# Patient Record
Sex: MALE | Race: WHITE | NOT HISPANIC OR LATINO | Employment: FULL TIME | URBAN - METROPOLITAN AREA
[De-identification: names, ages, dates, MRNs, and addresses within clinical notes are randomized per-mention and may not be internally consistent; named-entity substitution may affect disease eponyms.]

---

## 2023-10-29 PROBLEM — R21 RASH OF UNKNOWN CAUSE: Status: ACTIVE | Noted: 2023-10-29

## 2023-10-29 RX ORDER — EPINEPHRINE 0.3 MG/.3ML
0.3 INJECTION SUBCUTANEOUS
Status: ON HOLD | COMMUNITY
Start: 2020-02-11 | End: 2024-05-01 | Stop reason: ALTCHOICE

## 2023-10-31 ENCOUNTER — OFFICE VISIT (OUTPATIENT)
Dept: OTOLARYNGOLOGY | Facility: CLINIC | Age: 28
End: 2023-10-31
Payer: COMMERCIAL

## 2023-10-31 VITALS — BODY MASS INDEX: 29.27 KG/M2 | HEIGHT: 72 IN | WEIGHT: 216.1 LBS | TEMPERATURE: 96.7 F

## 2023-10-31 DIAGNOSIS — J34.2 NASAL SEPTAL DEVIATION: Primary | ICD-10-CM

## 2023-10-31 DIAGNOSIS — J34.89 NASAL OBSTRUCTION: ICD-10-CM

## 2023-10-31 DIAGNOSIS — J34.89 NASAL AND SINUS DISCHARGE: ICD-10-CM

## 2023-10-31 DIAGNOSIS — J32.8 OTHER CHRONIC SINUSITIS: ICD-10-CM

## 2023-10-31 DIAGNOSIS — J34.89 NASAL SEPTAL PERFORATION: ICD-10-CM

## 2023-10-31 DIAGNOSIS — M95.0 NASAL VALVE COLLAPSE: ICD-10-CM

## 2023-10-31 PROCEDURE — 99204 OFFICE O/P NEW MOD 45 MIN: CPT | Performed by: NURSE PRACTITIONER

## 2023-10-31 PROCEDURE — 31231 NASAL ENDOSCOPY DX: CPT | Performed by: NURSE PRACTITIONER

## 2023-10-31 PROCEDURE — 1036F TOBACCO NON-USER: CPT | Performed by: NURSE PRACTITIONER

## 2023-10-31 RX ORDER — DOXYCYCLINE 100 MG/1
100 TABLET ORAL 2 TIMES DAILY
Qty: 28 TABLET | Refills: 0 | Status: SHIPPED | OUTPATIENT
Start: 2023-10-31 | End: 2023-11-14

## 2023-10-31 RX ORDER — FLUTICASONE PROPIONATE 50 MCG
2 SPRAY, SUSPENSION (ML) NASAL DAILY
Qty: 16 G | Refills: 3 | Status: SHIPPED | OUTPATIENT
Start: 2023-10-31 | End: 2023-11-06 | Stop reason: SDUPTHER

## 2023-10-31 ASSESSMENT — PATIENT HEALTH QUESTIONNAIRE - PHQ9
SUM OF ALL RESPONSES TO PHQ9 QUESTIONS 1 AND 2: 0
1. LITTLE INTEREST OR PLEASURE IN DOING THINGS: NOT AT ALL
2. FEELING DOWN, DEPRESSED OR HOPELESS: NOT AT ALL

## 2023-10-31 NOTE — PROGRESS NOTES
Subjective   Patient ID: Edward Joseph is a 28 y.o. male who presents for New Patient Visit (2nd opinion from septoplasty surgery.).  HPI  Edward Joseph is a 28 y.o. old male   presenting with complaints of sinus and nose problems that began several years ago. The patient describes the sinus/nose problems as gradual onset of left sided nasal obstruction. Patient denies facial pressure, rhinorrhea, facial pain, periorbital edema, throat clearing, hoarseness, loss of taste, and loss of smell. The patient denies epistaxis. The patient has taken saline gel medications to alleviate the problem. The medication saline gel has not helped. The patient has not tried nasal saline irrigations. Does not have a history of allergic rhinitis or allergies. They deny a history of aspirin sensitivity. They report 0 sinus infections per year requiring antibiotic treatment. They deny recent antibiotic usage.     History of prior nasal/sinus surgery or procedure: Septoplasty in 2023 by Dr. Joshua Ennis    Review of Systems  Review of systems is negative for constitutional, ophthalmological, cardiac, pulmonary, renal, gastrointestinal, musculoskeletal, mental health, endocrine, or neurologic disorders (except as listed in the HPI, PM, and Problem List).     Objective   Physical Exam  CONSTITUTIONAL: Vital signs reviewed. Patient appears well developed and well nourished.   GENERAL: this is a healthy appearing male who appears stated age. The patient is alert and appropriately verbally conversant without hoarseness. This patient is in no apparent distress.   FACE: The face was inspected and no cutaneous masses or lesions were visualized. There was no erythema or edema noted. Facial movement was symmetric. No skin lesions were detected. There was no sinus tenderness elicited. TMJ crepitus absent.   EYES: Extra-ocular muscle function was intact. No nystagmus was observed. Pupils were equal.   CRANIAL NERVES: Cranial nerves II, III, IV,  and VI were noted to be intact via extra-ocular muscle movement testing. Cranial nerve VII noted to be intact and symmetric by facial movement. Cranial nerves IX and X noted to be intact by gag reflex and palatal movement. Cranial nerve XII noted to be intact by active and symmetric tongue movement.   NOSE: Examination of the nose revealed the nasal dorsum to be deviated to the left. Intranasal exam reveals the septum is deviated left posteriorly. The inferior turbinates were hypertrophic. Positive Wak[place] maneuver bilaterally. No masses, polyps, mucopus, or other lesion on anterior rhinoscopy. See below procedure note as applicable for further exam.  ORAL CAVITY: Examination of the oral cavity revealed no mass lesions nor infection. The palate was noted to be intact. The tongue exhibited normal mobility. Mucosa was moist without lesion. The lips were free of lesion. Gums were free of inflammation. Dentition: normal without obvious infection or inflammation  OROPHARYNX: The oral pharynx was free of mass lesion or mucosal abnormality. The palate was noted to be without lesion. The uvula was normal appearing. The tonsils were Absent.  EARS: Examination of the ears revealed that the auricles were normally formed with no lesions. The external auditory canals were normal. The tympanic membranes were intact.  There is no inflammation visualized.   NECK: Visualization and palpation of the neck revealed no mass lesions. No skin lesions or inflammatory processes were detected. The cervical musculature was normal to palpation.   CERVICAL LYMPHATICS: There were no palpable lymph nodes in the posterior triangle, submandibular triangle, jugulodigastric region, or central neck.  RESPIRATORY: Normal inspiration and expiration and chest wall expansion, no use of accessory muscles to breathe, no stridor.  NEUROLOGICAL: Patient is ambulatory without assist. Mentation is clear. Answering questions appropriately.     Nasal / sinus  endoscopy    Date/Time: 10/31/2023 11:49 AM    Performed by: LESLI Richardson  Authorized by: LESLI Richardson    Consent:     Consent obtained:  Verbal    Consent given by:  Patient    Risks discussed:  Bleeding, infection and pain    Alternatives discussed:  No treatment, observation and delayed treatment  Procedure details:     Indications: assessment of airway and sino-nasal symptoms      Medication:  Afrin and Francis-Synephrine 2%    Instrument: flexible fiberoptic nasal endoscope      Scope location: bilateral nare    Post-procedure details:     Patient tolerance of procedure:  Tolerated well, no immediate complications  Comments:      Findings: After topical decongestion with decongestant and anesthetic spray, nasal endoscopy was performed using an endoscope. The septum was deviated left posteriorly. Slightly anterior to this deviation there is an approximately 0.5cm perforation. The inferior turbinates were hypertrophic.  The middle turbinates appeared edematous, the middle meatus is with purulence bilaterally. The superior meatus and sphenoethmoid recess are clear bilaterally. The nasal passageway is patent. The nasopharynx was clear. There were no complications and the patient tolerated the procedure well.        Assessment/Plan     Ewdard Joseph is a 28 y.o. year old male with symptoms of nasal obstruction, nasal drainage and clinical findings consistent with chronic rhinosinusitis without nasal polyposis. Patient also has evidence of a residual posterior left nasal septal deviation, nasal valve collapse, nasal septal perforation and a visible deviation of the septum to the left. Rec. Facial plastics referral and doxy and CT Sinus to further assess.       PLAN:  1) Nasal Endoscopy today.Findings: left dev posterior, purulence from middle meatus bilaterally.  Reassurance and counseling provided to patient and his condition was extensively discussed including as noted below:  2) We  will start the patient on antibiotics to complete maximal medical therapy. Rx for doxycycline BID x 14 days. Advised to take with food and discontinue for adverse effects.  3) Patient was instructed to start steroid nasal spray.  4) A CT scan of the sinuses without contrast will be obtained post treatment.  5) At this time, I also recommended a referral to Facial Plastics. He will likely require a combo procedure with Facial Plastics or Facial Plastics alone if CT is not revealing of significant disease.  6) The patient will follow up after the CT scan has been completed.    All questions were answered and patient agrees with established plan of care.

## 2023-10-31 NOTE — PATIENT INSTRUCTIONS
Today you were evaluated by Dionne Encinas CNP.    Please follow-up in as needed. If you have any questions or concerns, please contact my office at [phone].     You will be started on Doxycycline, twice daily, for 2 full weeks.    As discussed, use of doxycycline can cause a skin sensitivity reaction with the sun. Avoid sun exposure while taking this medication. Also, do not take this medication with dairy. Take a daily probiotic.     A CT scan was ordered today. This is a non-contrast CT scan. Please call the  to create an appointment for this scan. However, we do not want you to obtain the imaging until you have completed the full course of the antibiotic medication prescribed. We would also like for you to obtain this CT scan at a [facility] facility.     Please contact our scheduling and  service at [phone]. They will work with you to get your test, imaging, or other appointments scheduled that might have been ordered. Call for CT.    Please contact our scheduling and  service at [phone]. They will work with you to get your test, imaging, or other appointments scheduled that might have been ordered. Call for Dr. Frankel.

## 2023-11-06 ENCOUNTER — OFFICE VISIT (OUTPATIENT)
Dept: OTOLARYNGOLOGY | Facility: CLINIC | Age: 28
End: 2023-11-06
Payer: COMMERCIAL

## 2023-11-06 VITALS
DIASTOLIC BLOOD PRESSURE: 77 MMHG | HEIGHT: 72 IN | BODY MASS INDEX: 29.93 KG/M2 | TEMPERATURE: 98 F | SYSTOLIC BLOOD PRESSURE: 159 MMHG | WEIGHT: 221 LBS

## 2023-11-06 DIAGNOSIS — J34.89 NASAL AND SINUS DISCHARGE: ICD-10-CM

## 2023-11-06 DIAGNOSIS — J34.2 NASAL SEPTAL DEVIATION: ICD-10-CM

## 2023-11-06 DIAGNOSIS — M95.0 NASAL VALVE COLLAPSE: ICD-10-CM

## 2023-11-06 DIAGNOSIS — J34.89 NASAL OBSTRUCTION: ICD-10-CM

## 2023-11-06 DIAGNOSIS — J34.89 NASAL SEPTAL PERFORATION: ICD-10-CM

## 2023-11-06 DIAGNOSIS — J32.8 OTHER CHRONIC SINUSITIS: ICD-10-CM

## 2023-11-06 PROCEDURE — 99222 1ST HOSP IP/OBS MODERATE 55: CPT | Performed by: OTOLARYNGOLOGY

## 2023-11-06 PROCEDURE — 31231 NASAL ENDOSCOPY DX: CPT | Performed by: OTOLARYNGOLOGY

## 2023-11-06 PROCEDURE — 1036F TOBACCO NON-USER: CPT | Performed by: OTOLARYNGOLOGY

## 2023-11-06 ASSESSMENT — PATIENT HEALTH QUESTIONNAIRE - PHQ9
SUM OF ALL RESPONSES TO PHQ9 QUESTIONS 1 AND 2: 0
2. FEELING DOWN, DEPRESSED OR HOPELESS: NOT AT ALL
1. LITTLE INTEREST OR PLEASURE IN DOING THINGS: NOT AT ALL

## 2023-11-06 NOTE — LETTER
November 7, 2023     PHILIPP Richardson-CNP  [address] [facility] [address]    Patient: Edward Joseph   YOB: 1995   Date of Visit: 11/6/2023       Dear Dr. Dionne Encinas, PHILIPP-CNP:    Thank you for referring Edward Joseph to me for evaluation. Below are my notes for this consultation.  If you have questions, please do not hesitate to call me. I look forward to following your patient along with you.       Sincerely,     Jonathan K Frankel, MD      CC: No Recipients  ______________________________________________________________________________________    .History Of Present Illness  Edward Joseph is a 28 y.o. male presenting with a history of chronic nasal congestion. He was referred to clinic today by DIANNE Encinas for consideration for open revision septoplasty. I personally reviewed her most recent note.    The patient reports that he had a septoplasty performed 6 months ago for recurrent epistaxis and moderate nasal congestion. However, since he had the procedure performed, he has not had any issues with epistaxis since then, but had not had any noticeable improvement with his nasal congestion symptoms. In fact, he believes that it may have actually gotten worse. On his exam with Dionne Encinas, he was also noted to have a posterior septal perforation and an acute sinus infection for which he was started on Doxycyline and Flonase nasal sprays. He is due for a post-treatment CT Sinus on 11/15.     He states he has never used Flonase prior to this past week. Denies any known nasal traumas.      Past Medical History  He has no past medical history on file.    Surgical History  He has no past surgical history on file. Prior septoplasty, Tonsillectomy     Social History  He reports that he has never smoked. He has never used smokeless tobacco. No history on file for alcohol use and drug use.    Family History  Family History   Family history unknown: Yes         Allergies  Penicillin    Review of Systems   Constitutional: Negative.    HENT: Negative.     Eyes: Negative.    Respiratory: Negative.     Cardiovascular: Negative.    Gastrointestinal: Negative.    Endocrine: Negative.    Genitourinary: Negative.    Musculoskeletal: Negative.    Skin: Negative.    Allergic/Immunologic: Negative.    Neurological: Negative.    Hematological: Negative.    Psychiatric/Behavioral: Negative.     These systems were reviewed and are negative unless otherwise stated in the HPI      Physical Exam     Constitutional   General appearance: Healthy-appearing, well-nourished, well groomed, in no acute distress.      Voice  Ability to communicate: Normal communication without aids, normal voice quality    Head and Face   Head and face: Atraumatic with no masses, lesions, or scarring.    Salivary glands: No tenderness of the parotid glands or parotid masses. No tenderness of the submandibular glands or submandibular masses.    Facial strength: Normal strength and symmetry, no synkinesis or facial tic.     Eyes   Pupils and irises: EOM intact, PERRLA, conjunctiva non-injected.     Ears  Otoscopic examination: Auditory canals with normal appearance and no obstruction or erythema. Membranes mobile per pneumatic otoscopy, pearly grey, with clear landmarks. No evidence of middle ear effusion.  External inspection of ears: Ears normally formed and free of lesions.     Nose   Dorsum Deviation of the nasal dorsum, no significant deviation of the nasal bones.  No nasal lesions, lacerations or scars.  No polyps  Nasal Mucosa Normal  Nasal tip Abnormal; rotated to the right 2/2 caudal septal deviation  Septum Abnormal; C-Shaped caudal septal deviation to the right  Mid septal deviation to the right  Inferior turbinates: bilateral hypertrophy  Modified Ashwin maneuver Abnormal-moderate improvement  bilaterally      Oral Cavity/Mouth   Lips, teeth, and gums: Normal lips, gums, and dentition.     Throat    Oropharynx: Mucosa moist, no lesions. Hard and soft palate normal. Tongue normal, no lesions or edema. No tonsillar masses or lesions. Normal tongue base.    Neck   Neck: Symmetrical, trachea midline.    Thyroid symmetrical, no enlargement, no tenderness, no nodules.     Pulmonary   Respiratory effort: Chest expands symmetrically. No audible wheeze.      Cardiovascular   Peripheral vascular system: No varicosities, carotid pulse normal, no edema. No jugular venous distension    Lymphatic   Palpation of cervical lymph nodes: No palpable lymph node enlargement, no submandibular adenopathy, no anterior cervical adenopathy, no supraclavicular adenopathy    Neurological/Psychiatric   Cranial nerves: Cranial nerves II - XII intact. Normal gait. No nystagmus  Orientation to person, place, and time: Normal.     Mood and affect: Normal.      Extremities   Appearance of extremities: Normal.       Procedure  .After verbal consent  and topical anesthesia a nasal endoscopy was performed.  No lesions in the nasal cavity, middle meatus or sphenoethmoid recess were seen.  No purulence was noted.  Posterior septal perforation noted--clean edges with no evidence of crusting or bleeding       Last Recorded Vitals  Blood pressure 159/77, temperature 36.7 °C (98 °F), height 1.829 m (6'), weight 100 kg (221 lb).    Relevant Results  Prior to Admission medications    Medication Sig Start Date End Date Taking? Authorizing Provider   doxycycline (Adoxa) 100 mg tablet Take 1 tablet (100 mg) by mouth 2 times a day for 14 days. Take with food twice daily. Take the full course of the medication. Be sure to wear a SPF or keep covered in the sun. 10/31/23 11/14/23 Yes PHILIPP Richardson-CNP   fluticasone (Flonase) 50 mcg/actuation nasal spray Administer 2 sprays into each nostril once daily. Shake gently. Before first use, prime pump. Spray 1 spray in each nostril twice daily. After use, clean tip and replace cap. 10/31/23 11/30/23 Yes  Dionne Encinas, APRN-CNP   EPINEPHrine 0.3 mg/0.3 mL injection syringe Inject 0.3 mL (0.3 mg) into the muscle. As Directed 2/11/20   Historical Provider, MD     No results found.       Assessment/Plan  Problem List Items Addressed This Visit    None  Visit Diagnoses       Nasal septal deviation        Nasal septal perforation        Other chronic sinusitis        Nasal valve collapse        Nasal and sinus discharge        Nasal obstruction               29y/o male with prior history of septoplasty for recurrent epistaxis and nasal congestion who presents to clinic for persistent nasal congestion and consideration for revision septoplasty.     The patient currently is a candidate for a revision procedure at this time, as his caudal deviation was not addressed with his previous surgery. However, with his recent sinus infection, we will wait until he gets his post-treatment CT Sinus prior to making any plans for surgery. In the interim, he will continue to use hs Flonase nasal sprays. We will plan for a virtual visit in about 4-6 weeks.     Given the patient's history of prior surgery and/or prior nasal trauma, I anticipate a possible need for additional sources of cartilage. We discussed the possible need for auricular v autologous costal cartilage v homologous costal cartilage. The patient is comfortable with these options, if necessary.    Given that the patient does not seem to be symptomatic from the perforation and that it is posterior in position, we will defer septal perforation repair.           Jonathan K Frankel, MD

## 2023-11-06 NOTE — PROGRESS NOTES
.History Of Present Illness  Edward Joseph is a 28 y.o. male presenting with a history of chronic nasal congestion. He was referred to clinic today by NP Dionne Encinas for consideration for open revision septoplasty. I personally reviewed her most recent note.    The patient reports that he had a septoplasty performed 6 months ago for recurrent epistaxis and moderate nasal congestion. However, since he had the procedure performed, he has not had any issues with epistaxis since then, but had not had any noticeable improvement with his nasal congestion symptoms. In fact, he believes that it may have actually gotten worse. On his exam with Dionne Encinas, he was also noted to have a posterior septal perforation and an acute sinus infection for which he was started on Doxycyline and Flonase nasal sprays. He is due for a post-treatment CT Sinus on 11/15.     He states he has never used Flonase prior to this past week. Denies any known nasal traumas.      Past Medical History  He has no past medical history on file.    Surgical History  He has no past surgical history on file. Prior septoplasty, Tonsillectomy     Social History  He reports that he has never smoked. He has never used smokeless tobacco. No history on file for alcohol use and drug use.    Family History  Family History   Family history unknown: Yes        Allergies  Penicillin    Review of Systems   Constitutional: Negative.    HENT: Negative.     Eyes: Negative.    Respiratory: Negative.     Cardiovascular: Negative.    Gastrointestinal: Negative.    Endocrine: Negative.    Genitourinary: Negative.    Musculoskeletal: Negative.    Skin: Negative.    Allergic/Immunologic: Negative.    Neurological: Negative.    Hematological: Negative.    Psychiatric/Behavioral: Negative.     These systems were reviewed and are negative unless otherwise stated in the HPI      Physical Exam     Constitutional   General appearance: Healthy-appearing, well-nourished,  well groomed, in no acute distress.      Voice  Ability to communicate: Normal communication without aids, normal voice quality    Head and Face   Head and face: Atraumatic with no masses, lesions, or scarring.    Salivary glands: No tenderness of the parotid glands or parotid masses. No tenderness of the submandibular glands or submandibular masses.    Facial strength: Normal strength and symmetry, no synkinesis or facial tic.     Eyes   Pupils and irises: EOM intact, PERRLA, conjunctiva non-injected.     Ears  Otoscopic examination: Auditory canals with normal appearance and no obstruction or erythema. Membranes mobile per pneumatic otoscopy, pearly grey, with clear landmarks. No evidence of middle ear effusion.  External inspection of ears: Ears normally formed and free of lesions.     Nose   Dorsum Deviation of the nasal dorsum, no significant deviation of the nasal bones.  No nasal lesions, lacerations or scars.  No polyps  Nasal Mucosa Normal  Nasal tip Abnormal; rotated to the right 2/2 caudal septal deviation  Septum Abnormal; C-Shaped caudal septal deviation to the right  Mid septal deviation to the right  Inferior turbinates: bilateral hypertrophy  Modified Ashwin maneuver Abnormal-moderate improvement  bilaterally      Oral Cavity/Mouth   Lips, teeth, and gums: Normal lips, gums, and dentition.     Throat   Oropharynx: Mucosa moist, no lesions. Hard and soft palate normal. Tongue normal, no lesions or edema. No tonsillar masses or lesions. Normal tongue base.    Neck   Neck: Symmetrical, trachea midline.    Thyroid symmetrical, no enlargement, no tenderness, no nodules.     Pulmonary   Respiratory effort: Chest expands symmetrically. No audible wheeze.      Cardiovascular   Peripheral vascular system: No varicosities, carotid pulse normal, no edema. No jugular venous distension    Lymphatic   Palpation of cervical lymph nodes: No palpable lymph node enlargement, no submandibular adenopathy, no anterior  cervical adenopathy, no supraclavicular adenopathy    Neurological/Psychiatric   Cranial nerves: Cranial nerves II - XII intact. Normal gait. No nystagmus  Orientation to person, place, and time: Normal.     Mood and affect: Normal.      Extremities   Appearance of extremities: Normal.       Procedure  .After verbal consent  and topical anesthesia a nasal endoscopy was performed.  No lesions in the nasal cavity, middle meatus or sphenoethmoid recess were seen.  No purulence was noted.  Posterior septal perforation noted--clean edges with no evidence of crusting or bleeding       Last Recorded Vitals  Blood pressure 159/77, temperature 36.7 °C (98 °F), height 1.829 m (6'), weight 100 kg (221 lb).    Relevant Results  Prior to Admission medications    Medication Sig Start Date End Date Taking? Authorizing Provider   doxycycline (Adoxa) 100 mg tablet Take 1 tablet (100 mg) by mouth 2 times a day for 14 days. Take with food twice daily. Take the full course of the medication. Be sure to wear a SPF or keep covered in the sun. 10/31/23 11/14/23 Yes LESLI Richardson   fluticasone (Flonase) 50 mcg/actuation nasal spray Administer 2 sprays into each nostril once daily. Shake gently. Before first use, prime pump. Spray 1 spray in each nostril twice daily. After use, clean tip and replace cap. 10/31/23 11/30/23 Yes LESLI Richardson   EPINEPHrine 0.3 mg/0.3 mL injection syringe Inject 0.3 mL (0.3 mg) into the muscle. As Directed 2/11/20   Historical Provider, MD     No results found.       Assessment/Plan   Problem List Items Addressed This Visit    None  Visit Diagnoses       Nasal septal deviation        Nasal septal perforation        Other chronic sinusitis        Nasal valve collapse        Nasal and sinus discharge        Nasal obstruction               29y/o male with prior history of septoplasty for recurrent epistaxis and nasal congestion who presents to clinic for persistent nasal congestion  and consideration for revision septoplasty.     The patient currently is a candidate for a revision procedure at this time, as his caudal deviation was not addressed with his previous surgery. However, with his recent sinus infection, we will wait until he gets his post-treatment CT Sinus prior to making any plans for surgery. In the interim, he will continue to use hs Flonase nasal sprays. We will plan for a virtual visit in about 4-6 weeks.     Given the patient's history of prior surgery and/or prior nasal trauma, I anticipate a possible need for additional sources of cartilage. We discussed the possible need for auricular v autologous costal cartilage v homologous costal cartilage. The patient is comfortable with these options, if necessary.    Given that the patient does not seem to be symptomatic from the perforation and that it is posterior in position, we will defer septal perforation repair.           Jonathan K Frankel, MD

## 2023-11-07 PROBLEM — J34.829 NASAL VALVE COLLAPSE: Status: ACTIVE | Noted: 2023-11-07

## 2023-11-07 PROBLEM — M95.0 NASAL VALVE COLLAPSE: Status: ACTIVE | Noted: 2023-11-07

## 2023-11-07 PROBLEM — J34.2 NASAL SEPTAL DEVIATION: Status: ACTIVE | Noted: 2023-11-07

## 2023-11-07 PROBLEM — J32.8 OTHER CHRONIC SINUSITIS: Status: ACTIVE | Noted: 2023-11-07

## 2023-11-07 PROBLEM — J34.89 NASAL SEPTAL PERFORATION: Status: ACTIVE | Noted: 2023-11-07

## 2023-11-07 PROBLEM — J34.89 NASAL OBSTRUCTION: Status: ACTIVE | Noted: 2023-11-07

## 2023-11-07 RX ORDER — FLUTICASONE PROPIONATE 50 MCG
2 SPRAY, SUSPENSION (ML) NASAL DAILY
Qty: 16 G | Refills: 3 | Status: SHIPPED | OUTPATIENT
Start: 2023-11-07 | End: 2023-12-07

## 2023-12-14 ENCOUNTER — APPOINTMENT (OUTPATIENT)
Dept: OTOLARYNGOLOGY | Facility: CLINIC | Age: 28
End: 2023-12-14
Payer: COMMERCIAL

## 2024-01-04 ENCOUNTER — ANCILLARY PROCEDURE (OUTPATIENT)
Dept: RADIOLOGY | Facility: CLINIC | Age: 29
End: 2024-01-04
Payer: COMMERCIAL

## 2024-01-04 DIAGNOSIS — J32.8 OTHER CHRONIC SINUSITIS: ICD-10-CM

## 2024-01-04 PROCEDURE — 70486 CT MAXILLOFACIAL W/O DYE: CPT

## 2024-01-04 PROCEDURE — 70486 CT MAXILLOFACIAL W/O DYE: CPT | Performed by: RADIOLOGY

## 2024-01-09 ENCOUNTER — TELEMEDICINE (OUTPATIENT)
Dept: OTOLARYNGOLOGY | Facility: CLINIC | Age: 29
End: 2024-01-09
Payer: COMMERCIAL

## 2024-01-09 DIAGNOSIS — J34.89 NASAL AND SINUS DISCHARGE: ICD-10-CM

## 2024-01-09 DIAGNOSIS — J34.89 NASAL SEPTAL PERFORATION: ICD-10-CM

## 2024-01-09 DIAGNOSIS — J32.2 CHRONIC ETHMOIDAL SINUSITIS: ICD-10-CM

## 2024-01-09 DIAGNOSIS — J34.2 NASAL SEPTAL DEVIATION: Primary | ICD-10-CM

## 2024-01-09 DIAGNOSIS — J32.0 CHRONIC MAXILLARY SINUSITIS: ICD-10-CM

## 2024-01-09 PROCEDURE — 99213 OFFICE O/P EST LOW 20 MIN: CPT | Performed by: NURSE PRACTITIONER

## 2024-01-09 NOTE — PROGRESS NOTES
Subjective   Patient ID: Edward Joseph is a 28 y.o. male who presents for No chief complaint on file..  HPI  Edward Joseph is a 28 y.o. year old male with symptoms of nasal obstruction, nasal drainage and clinical findings consistent with chronic rhinosinusitis without nasal polyposis. Patient also has evidence of a residual posterior left nasal septal deviation, nasal valve collapse, nasal septal perforation and a visible deviation of the septum to the left. Rec. Facial plastics referral and doxy and CT Sinus to further assess.   1/9/24- TELEHEALTH- Patient presents for follow-up. He notes that he continues with nasal obstruction, nasal drainage (anterior, can alternate between thick clear), he also has pressure throughout his face. He reports that he discussed having a rhinoplasty with Dr. Jonathan Frankel.     I personally reviewed the patients CT scan images and results. I discussed the results personally with the patient. The following findings were discussed: 1/4/24: CT Sinus: Septum midline with perforation. Moderate mucosal thickening to bilateral maxillary and ethmoid sinuses. Minimal in the frontal and sphenoid sinuses.     Per prior note:   Edward Joseph is a 28 y.o. old male   presenting with complaints of sinus and nose problems that began several years ago. The patient describes the sinus/nose problems as gradual onset of left sided nasal obstruction. Patient denies facial pressure, rhinorrhea, facial pain, periorbital edema, throat clearing, hoarseness, loss of taste, and loss of smell. The patient denies epistaxis. The patient has taken saline gel medications to alleviate the problem. The medication saline gel has not helped. The patient has not tried nasal saline irrigations. Does not have a history of allergic rhinitis or allergies. They deny a history of aspirin sensitivity. They report 0 sinus infections per year requiring antibiotic treatment. They deny recent antibiotic usage.      History of prior nasal/sinus surgery or procedure: Septoplasty in 2023 by Dr. Joshua Ennis    Review of Systems  Review of systems is negative for constitutional, ophthalmological, cardiac, pulmonary, renal, gastrointestinal, musculoskeletal, mental health, endocrine, or neurologic disorders (except as listed in the HPI, PMH, and Problem List).     Objective   Physical Exam  CONSTITUTIONAL: Patient appears well developed and well nourished.   GENERAL: this is a healthy appearing male who appears stated age. The patient is alert and appropriately verbally conversant without hoarseness. This patient is in no apparent distress.   FACE: The face was inspected and no cutaneous masses or lesions were visualized. There was no erythema or edema noted. Facial movement was symmetric. No skin lesions were detected.    EYES: Extra-ocular muscle function was intact. No nystagmus was observed. Pupils were equal.   NOSE: Examination of the nose revealed the nasal dorsum to be deviated to the left. RESPIRATORY: Normal inspiration and expiration and chest wall expansion, no use of accessory muscles to breathe, no stridor.  NEUROLOGICAL: Patient is ambulatory without assist. Mentation is clear. Answering questions appropriately.       Assessment/Plan     Edward Joseph is a 28 y.o. year old male with symptoms of nasal obstruction, nasal drainage and clinical findings consistent with chronic rhinosinusitis without nasal polyposis. Patient also has evidence of a residual posterior left nasal septal deviation, nasal valve collapse, nasal septal perforation and a visible deviation of the septum to the left. Rec. Facial plastics referral and doxy and CT Sinus to further assess.   1/9/24- TELEHEALTH- Continues with obstruction, drainage. Will attempt to coordinate combo with Dr. Frankel and refer to Dr. Remy Torres.      PLAN:  1) Telehealth visit today.  Reassurance and counseling provided to patient and his condition was  extensively discussed including as noted below:  2) I personally reviewed the patients CT scan images and results. I discussed the results personally with the patient. The following findings were discussed: 1/4/24: CT Sinus: Septum midline with perforation. Moderate mucosal thickening to bilateral maxillary and ethmoid sinuses. Minimal in the frontal and sphenoid sinuses.   3) Patient was instructed to start steroid nasal spray.  4) The patient and myself had an extensive discussion regarding next steps for further management. We discussed endoscopic sinus surgery at length. We discussed implications for treatment. Patient referred for surgical management with Dr. Remy Torres. His office was contacted directly for scheduling.     All questions were answered and patient agrees with established plan of care.    11 minutes was spent on this patient's visit. More than 50% of that time was spent in counseling regarding the possible etiologies, test results, treatment options and coordinating care.

## 2024-01-15 ENCOUNTER — OFFICE VISIT (OUTPATIENT)
Dept: OTOLARYNGOLOGY | Facility: CLINIC | Age: 29
End: 2024-01-15
Payer: COMMERCIAL

## 2024-01-15 VITALS — WEIGHT: 223 LBS | TEMPERATURE: 97.8 F | BODY MASS INDEX: 30.2 KG/M2 | HEIGHT: 72 IN

## 2024-01-15 DIAGNOSIS — J32.2 CHRONIC ETHMOIDAL SINUSITIS: ICD-10-CM

## 2024-01-15 DIAGNOSIS — R09.81 NASAL CONGESTION: ICD-10-CM

## 2024-01-15 DIAGNOSIS — J34.2 DEVIATED SEPTUM: ICD-10-CM

## 2024-01-15 DIAGNOSIS — J32.0 CHRONIC MAXILLARY SINUSITIS: Primary | ICD-10-CM

## 2024-01-15 PROCEDURE — 1036F TOBACCO NON-USER: CPT | Performed by: OTOLARYNGOLOGY

## 2024-01-15 PROCEDURE — 99214 OFFICE O/P EST MOD 30 MIN: CPT | Performed by: OTOLARYNGOLOGY

## 2024-01-15 PROCEDURE — 31231 NASAL ENDOSCOPY DX: CPT | Performed by: OTOLARYNGOLOGY

## 2024-01-15 ASSESSMENT — PATIENT HEALTH QUESTIONNAIRE - PHQ9
2. FEELING DOWN, DEPRESSED OR HOPELESS: NOT AT ALL
1. LITTLE INTEREST OR PLEASURE IN DOING THINGS: NOT AT ALL
SUM OF ALL RESPONSES TO PHQ9 QUESTIONS 1 AND 2: 0

## 2024-01-15 NOTE — PROGRESS NOTES
Chief Complaint:  Chronic sinusitis; nasal congestion    History Of Present Illness:  Irwin presents as a new patient to me but he has been evaluated by my nurse practitioner partner both October 31, 2023 and virtually January 9, 2024.  He has been evaluated by Dr. Frankel November 6, 2023    Years of syptoms worse over last year     Main Symptoms:  Patient does not have anterior nasal drainage.     Patient has  posterior nasal drainage.  off and on  Patient has nasal airway obstruction. bilaterally  Patient has  facial pain.  cheeks  Patient has  facial pressure.  cheeks  Patient does not have decreased sense of smell.   Associated Symptoms:   Patient has  headaches.    Patient does not have throat clearing.    Patient does not have coughing.    Patient does not have dysphonia.   Patient has nasal bleeding.  Bilateral    Medications currently on for sinonasal symptoms:   None.  Medications tried in the past for sinonasal symptoms:   Flonase around one month, Doxycycline    Other Pertinent Medical Conditions:   Patient does not have asthma.    Patient does not have aspirin sensitivity.    Patient does not have migraines.    Patient does not have history of allergy testing.   Patient has history of sinus surgery.  Dr. Ennis septoplasty  Patient does not have history of nasal fracture.    Patient does not have heartburn.    The patient does not take medical therapy for heartburn.   The patient has had imaging of sinuses. When: 1/4/23    Edward Joseph presents with ongoing symptoms as outlined above.  He has been evaluated by my nurse practitioner partner as well as Dr. Frankel for his ongoing symptoms.  Of note he has undergone a previous septoplasty with a different provider but unfortunately continues with nasal airway obstruction.  He has used Flonase in the past without resolution of his symptoms.     Active Problems:  Patient Active Problem List   Diagnosis    Rash of unknown cause    Nasal septal perforation     Nasal septal deviation    Nasal valve collapse    Nasal obstruction    Other chronic sinusitis        Past Medical History:  He has no past medical history on file.    Surgical History:  He has no past surgical history on file.     Family History:  Family History   Family history unknown: Yes       Social History:  He reports that he has never smoked. He has never used smokeless tobacco. No history on file for alcohol use and drug use.     Allergies:  Penicillin    Current Meds:    Current Outpatient Medications:     EPINEPHrine 0.3 mg/0.3 mL injection syringe, Inject 0.3 mL (0.3 mg) into the muscle. As Directed, Disp: , Rfl:     fluticasone (Flonase) 50 mcg/actuation nasal spray, Administer 2 sprays into each nostril once daily. Shake gently. Before first use, prime pump. Spray 1 spray in each nostril twice daily. After use, clean tip and replace cap., Disp: 16 g, Rfl: 3    Vitals:  Visit Vitals  Temp 36.6 °C (97.8 °F)   Ht 1.829 m (6')   Wt 101 kg (223 lb)   BMI 30.24 kg/m²   Smoking Status Never   BSA 2.27 m²        Physical Exam:  Nose: On external exam there are neither lesions nor asymmetry of the nasal tip/dorsum. On anterior rhinoscopy, visualization posteriorly is limited on anterior examination. For this reason, to adequately evaluate posteriorly for masses, source of epistaxis, polypoid disease, debridement, and/or signs of infections, nasal endoscopy is indicated.  (Please see procedure below.)    SINONASAL ENDOSCOPY (CPT 08903): To better evaluate the patient's symptoms, sinonasal endoscopy is indicated.  After discussion of risks and benefits, and topical decongestion and anesthesia,an endoscope was used to perform nasal endoscopy on each side.  A time out identifying the patient, the procedure, the location of the procedure and any concerns was performed prior to beginning the procedure.    Findings:  Examination of the right nasal cavity revealed a deviated septum to that side with a caudal and  dorsal component.  There were clear thick secretions draining from each middle meatus.  The sphenoethmoid recess was normal bilaterally.  He has a mid septal perforation.    Results/Data:  I personally reviewed his CT sinus from January 4, 2024 with the patient today in clinic.  There were inflammatory changes within each maxillary, ethmoid, and frontal.  Each sphenoid was clear.    Provider Impressions:  1.  Chronic sinusitis  2.  Nasal airway obstruction; residual septal deviation to the right following septoplasty, septal perforation  3.  Postnasal drainage  4.  Facial pain and pressure, headaches  5.  Epistaxis    Discussion: Edward Joseph and I discussed options.  He is planning to undergo revision nasal airway surgery with Dr. Frankel and I think this is more than reasonable given his exam today.  We discussed the inherent complexity of revision nasal airway surgery particularly in the context of deviations within the first 15 mm of his nasal septum.  He and I discussed his ongoing symptoms outside of congestion specifically postnasal drainage and facial pain and pressure.  Based on his CT scan, I do think chronic sinusitis is driving a portion of his symptoms.  He was comfortable considering surgical options for his sinuses.    The sinus surgery itself was discussed at length.  The risks, benefits, and alternatives were explained in detail which include but is not limited to: persistence of symptoms, pain, bleeding, smell loss or alteration, infection, need for nasal packing, double vision, vision loss, CSF leak requiring other procedures to repair, numbness of teeth/and or face, need for revision surgery, and unexpected risks.      He has follow-up scheduled with Dr. Frankel in the next few weeks.  We can plan for a joint procedure with Dr. Frankel addressing his nasal airway and I will be addressing his sinuses.    I will schedule the patient at their convenience going forward.  All questions were  answered.    Patient Discussion/Summary:  Please feel free to contact my office by calling [phone] with any questions.    Signature:  Remy Torres MD

## 2024-01-18 ENCOUNTER — TELEMEDICINE (OUTPATIENT)
Dept: OTOLARYNGOLOGY | Facility: CLINIC | Age: 29
End: 2024-01-18
Payer: COMMERCIAL

## 2024-01-18 DIAGNOSIS — J34.89 NASAL OBSTRUCTION: ICD-10-CM

## 2024-01-18 PROBLEM — J34.2 DEVIATED NASAL SEPTUM: Status: ACTIVE | Noted: 2024-01-18

## 2024-01-18 PROBLEM — J32.0 CHRONIC MAXILLARY SINUSITIS: Status: ACTIVE | Noted: 2024-01-18

## 2024-01-18 PROBLEM — J34.3 HYPERTROPHY OF INFERIOR NASAL TURBINATE: Status: ACTIVE | Noted: 2024-01-18

## 2024-01-18 PROBLEM — J32.2 CHRONIC ETHMOIDAL SINUSITIS: Status: ACTIVE | Noted: 2024-01-18

## 2024-01-18 PROCEDURE — 99212 OFFICE O/P EST SF 10 MIN: CPT | Performed by: OTOLARYNGOLOGY

## 2024-01-18 NOTE — PROGRESS NOTES
Patient presenting virtually for follow-up of nasal airway obstruction.    Patient was seen Dr. Torres for evaluation of bilateral fess.    No improvement with medical management    Patient is a candidate for nasal airway surgery and bilateral fess.  We will schedule as a combo case.    I had a thorough conversation with the patient during which I discussed the risks, benefits, and alternatives of surgery.  The risks that were discussed included, but were not exclusive to, persistence of symptoms, anesthesia, pain, changes in loss of smell, nasal obstruction, septal perforation, bleeding, infection, need for nasal packing, CSF leak requiring other procedures to repair, numbness of teeth and/or face, need for revision surgery, injury to surrounding tissue and unexpected risks.  No guarantees were made. The patient's questions were appropriately addressed.  The patient expressed understanding.    Patient is not interested in aesthetic changes, but understands that some changes may occur as a result of functional improvement.    Will proceed with scheduling    Jonathan Frankel, MD  Facial Plastic Surgery  Otolaryngology - [facility]    This note was dictated with Dragon Naturally Speaking and may contain some grammatical errors due to limitations of the software.

## 2024-04-29 ENCOUNTER — ANESTHESIA EVENT (OUTPATIENT)
Dept: OPERATING ROOM | Facility: CLINIC | Age: 29
End: 2024-04-29
Payer: COMMERCIAL

## 2024-05-01 ENCOUNTER — ANESTHESIA (OUTPATIENT)
Dept: OPERATING ROOM | Facility: CLINIC | Age: 29
End: 2024-05-01
Payer: COMMERCIAL

## 2024-05-01 ENCOUNTER — HOSPITAL ENCOUNTER (OUTPATIENT)
Facility: CLINIC | Age: 29
Setting detail: OUTPATIENT SURGERY
Discharge: HOME | End: 2024-05-01
Attending: OTOLARYNGOLOGY | Admitting: OTOLARYNGOLOGY
Payer: COMMERCIAL

## 2024-05-01 VITALS
WEIGHT: 206.35 LBS | TEMPERATURE: 98.4 F | DIASTOLIC BLOOD PRESSURE: 72 MMHG | SYSTOLIC BLOOD PRESSURE: 124 MMHG | OXYGEN SATURATION: 94 % | HEART RATE: 82 BPM | BODY MASS INDEX: 27.99 KG/M2 | RESPIRATION RATE: 16 BRPM

## 2024-05-01 DIAGNOSIS — J34.89 NASAL OBSTRUCTION: ICD-10-CM

## 2024-05-01 DIAGNOSIS — J34.89 NASAL VALVE STENOSIS: ICD-10-CM

## 2024-05-01 DIAGNOSIS — J32.2 CHRONIC ETHMOIDAL SINUSITIS: ICD-10-CM

## 2024-05-01 DIAGNOSIS — J34.2 DEVIATED NASAL SEPTUM: ICD-10-CM

## 2024-05-01 DIAGNOSIS — J34.3 HYPERTROPHY OF INFERIOR NASAL TURBINATE: ICD-10-CM

## 2024-05-01 DIAGNOSIS — M95.0 NASAL VALVE COLLAPSE: Primary | ICD-10-CM

## 2024-05-01 DIAGNOSIS — J32.0 CHRONIC MAXILLARY SINUSITIS: ICD-10-CM

## 2024-05-01 PROCEDURE — 30465 REPAIR NASAL STENOSIS: CPT | Performed by: OTOLARYNGOLOGY

## 2024-05-01 PROCEDURE — 2780000003 HC OR 278 NO HCPCS: Performed by: OTOLARYNGOLOGY

## 2024-05-01 PROCEDURE — A30520 PR REPAIR OF NASAL SEPTUM: Performed by: NURSE ANESTHETIST, CERTIFIED REGISTERED

## 2024-05-01 PROCEDURE — 7100000010 HC PHASE TWO TIME - EACH INCREMENTAL 1 MINUTE: Performed by: OTOLARYNGOLOGY

## 2024-05-01 PROCEDURE — 2500000004 HC RX 250 GENERAL PHARMACY W/ HCPCS (ALT 636 FOR OP/ED): Performed by: OTOLARYNGOLOGY

## 2024-05-01 PROCEDURE — 3700000002 HC GENERAL ANESTHESIA TIME - EACH INCREMENTAL 1 MINUTE: Performed by: OTOLARYNGOLOGY

## 2024-05-01 PROCEDURE — 30140 RESECT INFERIOR TURBINATE: CPT | Performed by: OTOLARYNGOLOGY

## 2024-05-01 PROCEDURE — 2500000005 HC RX 250 GENERAL PHARMACY W/O HCPCS: Performed by: OTOLARYNGOLOGY

## 2024-05-01 PROCEDURE — 2500000001 HC RX 250 WO HCPCS SELF ADMINISTERED DRUGS (ALT 637 FOR MEDICARE OP): Performed by: NURSE ANESTHETIST, CERTIFIED REGISTERED

## 2024-05-01 PROCEDURE — 0753T DGTZ GLS MCRSCP SLD LEVEL IV: CPT | Mod: TC,SUR,ELYLAB | Performed by: OTOLARYNGOLOGY

## 2024-05-01 PROCEDURE — 88311 DECALCIFY TISSUE: CPT | Performed by: PATHOLOGY

## 2024-05-01 PROCEDURE — A4217 STERILE WATER/SALINE, 500 ML: HCPCS | Performed by: OTOLARYNGOLOGY

## 2024-05-01 PROCEDURE — 31253 NSL/SINS NDSC TOTAL: CPT | Performed by: OTOLARYNGOLOGY

## 2024-05-01 PROCEDURE — 20910 REMOVE CARTILAGE FOR GRAFT: CPT | Performed by: OTOLARYNGOLOGY

## 2024-05-01 PROCEDURE — 7100000002 HC RECOVERY ROOM TIME - EACH INCREMENTAL 1 MINUTE: Performed by: OTOLARYNGOLOGY

## 2024-05-01 PROCEDURE — 61782 SCAN PROC CRANIAL EXTRA: CPT | Performed by: OTOLARYNGOLOGY

## 2024-05-01 PROCEDURE — 7100000009 HC PHASE TWO TIME - INITIAL BASE CHARGE: Performed by: OTOLARYNGOLOGY

## 2024-05-01 PROCEDURE — 3600000009 HC OR TIME - EACH INCREMENTAL 1 MINUTE - PROCEDURE LEVEL FOUR: Performed by: OTOLARYNGOLOGY

## 2024-05-01 PROCEDURE — A30520 PR REPAIR OF NASAL SEPTUM: Performed by: ANESTHESIOLOGY

## 2024-05-01 PROCEDURE — 30520 REPAIR OF NASAL SEPTUM: CPT | Performed by: OTOLARYNGOLOGY

## 2024-05-01 PROCEDURE — 88305 TISSUE EXAM BY PATHOLOGIST: CPT | Performed by: PATHOLOGY

## 2024-05-01 PROCEDURE — 2500000005 HC RX 250 GENERAL PHARMACY W/O HCPCS: Performed by: NURSE ANESTHETIST, CERTIFIED REGISTERED

## 2024-05-01 PROCEDURE — 3700000001 HC GENERAL ANESTHESIA TIME - INITIAL BASE CHARGE: Performed by: OTOLARYNGOLOGY

## 2024-05-01 PROCEDURE — 2500000004 HC RX 250 GENERAL PHARMACY W/ HCPCS (ALT 636 FOR OP/ED): Performed by: NURSE ANESTHETIST, CERTIFIED REGISTERED

## 2024-05-01 PROCEDURE — 2720000007 HC OR 272 NO HCPCS: Performed by: OTOLARYNGOLOGY

## 2024-05-01 PROCEDURE — 31267 ENDOSCOPY MAXILLARY SINUS: CPT | Performed by: OTOLARYNGOLOGY

## 2024-05-01 PROCEDURE — 31231 NASAL ENDOSCOPY DX: CPT | Performed by: OTOLARYNGOLOGY

## 2024-05-01 PROCEDURE — 3600000004 HC OR TIME - INITIAL BASE CHARGE - PROCEDURE LEVEL FOUR: Performed by: OTOLARYNGOLOGY

## 2024-05-01 PROCEDURE — 7100000001 HC RECOVERY ROOM TIME - INITIAL BASE CHARGE: Performed by: OTOLARYNGOLOGY

## 2024-05-01 PROCEDURE — 2500000001 HC RX 250 WO HCPCS SELF ADMINISTERED DRUGS (ALT 637 FOR MEDICARE OP): Performed by: OTOLARYNGOLOGY

## 2024-05-01 DEVICE — COSTAL CARTILAGE SHEET, LARGE: Type: IMPLANTABLE DEVICE | Site: NOSE | Status: FUNCTIONAL

## 2024-05-01 RX ORDER — LIDOCAINE HYDROCHLORIDE AND EPINEPHRINE 10; 10 MG/ML; UG/ML
INJECTION, SOLUTION INFILTRATION; PERINEURAL AS NEEDED
Status: DISCONTINUED | OUTPATIENT
Start: 2024-05-01 | End: 2024-05-01 | Stop reason: HOSPADM

## 2024-05-01 RX ORDER — METHOCARBAMOL 100 MG/ML
INJECTION, SOLUTION INTRAMUSCULAR; INTRAVENOUS AS NEEDED
Status: DISCONTINUED | OUTPATIENT
Start: 2024-05-01 | End: 2024-05-01

## 2024-05-01 RX ORDER — CEFAZOLIN 1 G/1
INJECTION, POWDER, FOR SOLUTION INTRAVENOUS AS NEEDED
Status: DISCONTINUED | OUTPATIENT
Start: 2024-05-01 | End: 2024-05-01

## 2024-05-01 RX ORDER — SODIUM CHLORIDE 0.65 %
2 AEROSOL, SPRAY (ML) NASAL
Qty: 44 ML | Refills: 3 | Status: SHIPPED | OUTPATIENT
Start: 2024-05-01

## 2024-05-01 RX ORDER — SODIUM CHLORIDE, SODIUM LACTATE, POTASSIUM CHLORIDE, CALCIUM CHLORIDE 600; 310; 30; 20 MG/100ML; MG/100ML; MG/100ML; MG/100ML
100 INJECTION, SOLUTION INTRAVENOUS CONTINUOUS
Status: DISCONTINUED | OUTPATIENT
Start: 2024-05-01 | End: 2024-05-01 | Stop reason: HOSPADM

## 2024-05-01 RX ORDER — ACETAMINOPHEN 325 MG/1
TABLET ORAL AS NEEDED
Status: DISCONTINUED | OUTPATIENT
Start: 2024-05-01 | End: 2024-05-01

## 2024-05-01 RX ORDER — HYDROMORPHONE HYDROCHLORIDE 1 MG/ML
0.5 INJECTION, SOLUTION INTRAMUSCULAR; INTRAVENOUS; SUBCUTANEOUS EVERY 5 MIN PRN
Status: DISCONTINUED | OUTPATIENT
Start: 2024-05-01 | End: 2024-05-01 | Stop reason: HOSPADM

## 2024-05-01 RX ORDER — SUCCINYLCHOLINE CHLORIDE 100 MG/5ML
SYRINGE (ML) INTRAVENOUS AS NEEDED
Status: DISCONTINUED | OUTPATIENT
Start: 2024-05-01 | End: 2024-05-01

## 2024-05-01 RX ORDER — PROPOFOL 10 MG/ML
INJECTION, EMULSION INTRAVENOUS AS NEEDED
Status: DISCONTINUED | OUTPATIENT
Start: 2024-05-01 | End: 2024-05-01

## 2024-05-01 RX ORDER — OXYMETAZOLINE HCL 0.05 %
SPRAY, NON-AEROSOL (ML) NASAL AS NEEDED
Status: DISCONTINUED | OUTPATIENT
Start: 2024-05-01 | End: 2024-05-01 | Stop reason: HOSPADM

## 2024-05-01 RX ORDER — HYDROMORPHONE HYDROCHLORIDE 1 MG/ML
INJECTION, SOLUTION INTRAMUSCULAR; INTRAVENOUS; SUBCUTANEOUS AS NEEDED
Status: DISCONTINUED | OUTPATIENT
Start: 2024-05-01 | End: 2024-05-01

## 2024-05-01 RX ORDER — OXYCODONE AND ACETAMINOPHEN 5; 325 MG/1; MG/1
2 TABLET ORAL EVERY 6 HOURS PRN
Qty: 20 TABLET | Refills: 0 | Status: SHIPPED | OUTPATIENT
Start: 2024-05-01

## 2024-05-01 RX ORDER — ALBUTEROL SULFATE 0.83 MG/ML
2.5 SOLUTION RESPIRATORY (INHALATION) ONCE AS NEEDED
Status: DISCONTINUED | OUTPATIENT
Start: 2024-05-01 | End: 2024-05-01 | Stop reason: HOSPADM

## 2024-05-01 RX ORDER — LIDOCAINE HYDROCHLORIDE 20 MG/ML
INJECTION, SOLUTION INFILTRATION; PERINEURAL AS NEEDED
Status: DISCONTINUED | OUTPATIENT
Start: 2024-05-01 | End: 2024-05-01

## 2024-05-01 RX ORDER — LABETALOL HYDROCHLORIDE 5 MG/ML
5 INJECTION, SOLUTION INTRAVENOUS ONCE AS NEEDED
Status: DISCONTINUED | OUTPATIENT
Start: 2024-05-01 | End: 2024-05-01 | Stop reason: HOSPADM

## 2024-05-01 RX ORDER — BUPIVACAINE HCL/EPINEPHRINE 0.5-1:200K
VIAL (ML) INJECTION AS NEEDED
Status: DISCONTINUED | OUTPATIENT
Start: 2024-05-01 | End: 2024-05-01 | Stop reason: HOSPADM

## 2024-05-01 RX ORDER — SCOLOPAMINE TRANSDERMAL SYSTEM 1 MG/1
PATCH, EXTENDED RELEASE TRANSDERMAL AS NEEDED
Status: DISCONTINUED | OUTPATIENT
Start: 2024-05-01 | End: 2024-05-01

## 2024-05-01 RX ORDER — FENTANYL CITRATE 50 UG/ML
INJECTION, SOLUTION INTRAMUSCULAR; INTRAVENOUS AS NEEDED
Status: DISCONTINUED | OUTPATIENT
Start: 2024-05-01 | End: 2024-05-01

## 2024-05-01 RX ORDER — LIDOCAINE HYDROCHLORIDE 40 MG/ML
SOLUTION TOPICAL AS NEEDED
Status: DISCONTINUED | OUTPATIENT
Start: 2024-05-01 | End: 2024-05-01

## 2024-05-01 RX ORDER — MUPIROCIN 20 MG/G
OINTMENT TOPICAL AS NEEDED
Status: DISCONTINUED | OUTPATIENT
Start: 2024-05-01 | End: 2024-05-01 | Stop reason: HOSPADM

## 2024-05-01 RX ORDER — GABAPENTIN 300 MG/1
CAPSULE ORAL AS NEEDED
Status: DISCONTINUED | OUTPATIENT
Start: 2024-05-01 | End: 2024-05-01

## 2024-05-01 RX ORDER — ONDANSETRON HYDROCHLORIDE 2 MG/ML
INJECTION, SOLUTION INTRAVENOUS AS NEEDED
Status: DISCONTINUED | OUTPATIENT
Start: 2024-05-01 | End: 2024-05-01

## 2024-05-01 RX ORDER — LIDOCAINE IN NACL,ISO-OSMOT/PF 30 MG/3 ML
0.1 SYRINGE (ML) INJECTION ONCE
Status: DISCONTINUED | OUTPATIENT
Start: 2024-05-01 | End: 2024-05-01 | Stop reason: HOSPADM

## 2024-05-01 RX ORDER — TRANEXAMIC ACID 100 MG/ML
INJECTION, SOLUTION INTRAVENOUS AS NEEDED
Status: DISCONTINUED | OUTPATIENT
Start: 2024-05-01 | End: 2024-05-01 | Stop reason: HOSPADM

## 2024-05-01 RX ORDER — ONDANSETRON HYDROCHLORIDE 2 MG/ML
4 INJECTION, SOLUTION INTRAVENOUS ONCE AS NEEDED
Status: DISCONTINUED | OUTPATIENT
Start: 2024-05-01 | End: 2024-05-01 | Stop reason: HOSPADM

## 2024-05-01 RX ORDER — METOPROLOL TARTRATE 1 MG/ML
INJECTION, SOLUTION INTRAVENOUS AS NEEDED
Status: DISCONTINUED | OUTPATIENT
Start: 2024-05-01 | End: 2024-05-01

## 2024-05-01 RX ORDER — ONDANSETRON 4 MG/1
4 TABLET, FILM COATED ORAL EVERY 6 HOURS PRN
Qty: 10 TABLET | Refills: 0 | Status: SHIPPED | OUTPATIENT
Start: 2024-05-01 | End: 2024-05-08

## 2024-05-01 RX ORDER — CEPHALEXIN 500 MG/1
500 CAPSULE ORAL 2 TIMES DAILY
Qty: 8 CAPSULE | Refills: 0 | Status: SHIPPED | OUTPATIENT
Start: 2024-05-01 | End: 2024-05-05

## 2024-05-01 RX ORDER — PROPOFOL 10 MG/ML
INJECTION, EMULSION INTRAVENOUS CONTINUOUS PRN
Status: DISCONTINUED | OUTPATIENT
Start: 2024-05-01 | End: 2024-05-01

## 2024-05-01 RX ORDER — DOCUSATE SODIUM 100 MG/1
100 CAPSULE, LIQUID FILLED ORAL 2 TIMES DAILY PRN
Qty: 60 CAPSULE | Refills: 0 | Status: SHIPPED | OUTPATIENT
Start: 2024-05-01

## 2024-05-01 RX ORDER — OXYMETAZOLINE HCL 0.05 %
2 SPRAY, NON-AEROSOL (ML) NASAL ONCE
Status: COMPLETED | OUTPATIENT
Start: 2024-05-01 | End: 2024-05-01

## 2024-05-01 RX ORDER — SODIUM CHLORIDE 0.9 G/100ML
IRRIGANT IRRIGATION AS NEEDED
Status: DISCONTINUED | OUTPATIENT
Start: 2024-05-01 | End: 2024-05-01 | Stop reason: HOSPADM

## 2024-05-01 RX ORDER — POVIDONE-IODINE 5 %
SOLUTION, NON-ORAL OPHTHALMIC (EYE) AS NEEDED
Status: DISCONTINUED | OUTPATIENT
Start: 2024-05-01 | End: 2024-05-01 | Stop reason: HOSPADM

## 2024-05-01 RX ORDER — MIDAZOLAM HYDROCHLORIDE 1 MG/ML
INJECTION, SOLUTION INTRAMUSCULAR; INTRAVENOUS AS NEEDED
Status: DISCONTINUED | OUTPATIENT
Start: 2024-05-01 | End: 2024-05-01

## 2024-05-01 RX ORDER — OXYCODONE HYDROCHLORIDE 5 MG/1
5 TABLET ORAL EVERY 4 HOURS PRN
Status: DISCONTINUED | OUTPATIENT
Start: 2024-05-01 | End: 2024-05-01 | Stop reason: HOSPADM

## 2024-05-01 RX ADMIN — HYDROMORPHONE HYDROCHLORIDE 0.2 MG: 1 INJECTION, SOLUTION INTRAMUSCULAR; INTRAVENOUS; SUBCUTANEOUS at 11:18

## 2024-05-01 RX ADMIN — Medication 140 MG: at 07:43

## 2024-05-01 RX ADMIN — ONDANSETRON 4 MG: 2 INJECTION INTRAMUSCULAR; INTRAVENOUS at 12:14

## 2024-05-01 RX ADMIN — FENTANYL CITRATE 100 MCG: 50 INJECTION, SOLUTION INTRAMUSCULAR; INTRAVENOUS at 07:33

## 2024-05-01 RX ADMIN — GABAPENTIN 300 MG: 300 CAPSULE ORAL at 07:15

## 2024-05-01 RX ADMIN — METHOCARBAMOL 300 MG: 100 INJECTION, SOLUTION INTRAMUSCULAR; INTRAVENOUS at 07:57

## 2024-05-01 RX ADMIN — METOPROLOL TARTRATE 3 MG: 1 INJECTION, SOLUTION INTRAVENOUS at 11:12

## 2024-05-01 RX ADMIN — METOPROLOL TARTRATE 2 MG: 1 INJECTION, SOLUTION INTRAVENOUS at 11:19

## 2024-05-01 RX ADMIN — PROPOFOL 200 MCG/KG/MIN: 10 INJECTION, EMULSION INTRAVENOUS at 07:48

## 2024-05-01 RX ADMIN — MIDAZOLAM 2 MG: 1 INJECTION INTRAMUSCULAR; INTRAVENOUS at 07:30

## 2024-05-01 RX ADMIN — CEFAZOLIN 2 G: 1 INJECTION, POWDER, FOR SOLUTION INTRAMUSCULAR; INTRAVENOUS at 07:49

## 2024-05-01 RX ADMIN — LIDOCAINE HYDROCHLORIDE 4 ML: 40 SOLUTION TOPICAL at 07:45

## 2024-05-01 RX ADMIN — LIDOCAINE HYDROCHLORIDE 50 ML: 20 INJECTION, SOLUTION INFILTRATION; PERINEURAL at 07:43

## 2024-05-01 RX ADMIN — Medication 2 SPRAY: at 14:35

## 2024-05-01 RX ADMIN — HYDROMORPHONE HYDROCHLORIDE 0.4 MG: 1 INJECTION, SOLUTION INTRAMUSCULAR; INTRAVENOUS; SUBCUTANEOUS at 10:40

## 2024-05-01 RX ADMIN — SCOPALAMINE 1 PATCH: 1 PATCH, EXTENDED RELEASE TRANSDERMAL at 07:15

## 2024-05-01 RX ADMIN — SODIUM CHLORIDE, SODIUM LACTATE, POTASSIUM CHLORIDE, AND CALCIUM CHLORIDE: .6; .31; .03; .02 INJECTION, SOLUTION INTRAVENOUS at 07:36

## 2024-05-01 RX ADMIN — HYDROMORPHONE HYDROCHLORIDE 0.2 MG: 1 INJECTION, SOLUTION INTRAMUSCULAR; INTRAVENOUS; SUBCUTANEOUS at 11:39

## 2024-05-01 RX ADMIN — METHOCARBAMOL 300 MG: 100 INJECTION, SOLUTION INTRAMUSCULAR; INTRAVENOUS at 08:04

## 2024-05-01 RX ADMIN — ACETAMINOPHEN 975 MG: 325 TABLET ORAL at 07:15

## 2024-05-01 RX ADMIN — HYDROMORPHONE HYDROCHLORIDE 0.2 MG: 1 INJECTION, SOLUTION INTRAMUSCULAR; INTRAVENOUS; SUBCUTANEOUS at 12:14

## 2024-05-01 RX ADMIN — PROPOFOL 250 MG: 10 INJECTION, EMULSION INTRAVENOUS at 07:43

## 2024-05-01 RX ADMIN — DEXAMETHASONE SODIUM PHOSPHATE 10 MG: 4 INJECTION INTRA-ARTICULAR; INTRALESIONAL; INTRAMUSCULAR; INTRAVENOUS; SOFT TISSUE at 07:54

## 2024-05-01 RX ADMIN — CEFAZOLIN 2 G: 1 INJECTION, POWDER, FOR SOLUTION INTRAMUSCULAR; INTRAVENOUS at 11:39

## 2024-05-01 RX ADMIN — METHOCARBAMOL 400 MG: 100 INJECTION, SOLUTION INTRAMUSCULAR; INTRAVENOUS at 08:09

## 2024-05-01 RX ADMIN — PROPOFOL 30 MG: 10 INJECTION, EMULSION INTRAVENOUS at 09:35

## 2024-05-01 SDOH — HEALTH STABILITY: MENTAL HEALTH: CURRENT SMOKER: 0

## 2024-05-01 ASSESSMENT — PAIN DESCRIPTION - DESCRIPTORS: DESCRIPTORS: PRESSURE

## 2024-05-01 ASSESSMENT — PAIN - FUNCTIONAL ASSESSMENT
PAIN_FUNCTIONAL_ASSESSMENT: 0-10

## 2024-05-01 ASSESSMENT — PAIN SCALES - GENERAL
PAINLEVEL_OUTOF10: 3
PAINLEVEL_OUTOF10: 2
PAINLEVEL_OUTOF10: 0 - NO PAIN

## 2024-05-01 ASSESSMENT — COLUMBIA-SUICIDE SEVERITY RATING SCALE - C-SSRS
2. HAVE YOU ACTUALLY HAD ANY THOUGHTS OF KILLING YOURSELF?: NO
6. HAVE YOU EVER DONE ANYTHING, STARTED TO DO ANYTHING, OR PREPARED TO DO ANYTHING TO END YOUR LIFE?: NO
1. IN THE PAST MONTH, HAVE YOU WISHED YOU WERE DEAD OR WISHED YOU COULD GO TO SLEEP AND NOT WAKE UP?: NO

## 2024-05-01 NOTE — DISCHARGE INSTRUCTIONS
Nurse Line number [phone]   Post Op Highlights   You may experience bloody drainage in yourmouth (including small clots). This is normal, especially in the morning or after sleeping.   You should notify your doctor if the bleeding   is profuse or continuous.   Take prescribed pain medication as needed forpain. If you experience nausea or headaches (common side effects of narcotic pain medication) stop taking the prescription pain medication and use extra strength Tylenol/Motrin/Advil/Ibuprofen for pain instead.   If you experience bleeding from the nose useAfrin as prescribed. Afrin Regimen: 3 sprays eachnostril, apply pressure and wait 10 minutes, ifbleeding persists use another 3 sprays; again,apply pressure and wait 10 minutes, if stillbleeding repeat Afrin a 3rd time. After usingAfrin, 3 sprays x 3 over 30 minutes, if bleedingdoes not stop contact your doctor.  Do NOT blow your nose for 10 days after surgery.  Do NOT wear glasses for 21 days after surgery.  You may only wear glasses while also wearing thesplint on the nose for the first 6 weeks  after surgery.     Things to expect:   1. Swelling of the surrounding tissues may   become a little greater the second day or third day after surgery.   2. Discoloration or bruising of the surrounding tissues is normal. The bruising may be greater on the second or third day. It usually does not last more than 1 week.   3. Nasal blockage is to be expected after a Rhinoplasty procedure and will gradually subside over a period of time.   4. There is usually some pain following nasal surgery, this may become worse at night or when you become anxious or nervous. Take the pain medicine prescribed or over the counter alternative as needed.   5. Numbness or tingling sensation at the tip of the nose and/or upper lip is normal, this will improve throughout the healing process.   6. Occasionally, crusting will occur around the sutures. Do not try and remove this yourself. This  is normal and will resolve. Patting the area with a warm moist compress or hydrogen peroxide applied with cotton ball may help this to fall off.   7. You may experience some weakness or dizziness. This generally will clear up after a few days. Be sure to drink plenty of fluids.   8. You may experience some sharp shooting pain or itching during you healing process. This is normal and will resolve in a few weeks.   9. You may experience a period of mild depression after cosmetic surgery. This is related to the shock of seeing your nose swollen and discolored. Remember this is a temporary condition.  10. You may be up and around the house without performing any strenuous activities.  11. You should wear clothing that fastens in the front for one week. Do not wear clothes that have to be pulled over the head.  Most of all, be patient during the healing process. If you have further questions, you are urged to call us.     Things to avoid:  15. No contact sports for 2 months.  16. Do not blow your nose for 10 days. After that blow through both sides at once. Do not compress one side.  17. Avoid sneezing, if you must sneeze with your   mouth open.  18. Avoid bending over or lifting heavy objects for 1 week.  19. Avoid excessive grinning and smiling.  20. Avoid sniffing. This can aggravate the swelling.   21. Avoid constantly rubbing the nostrils and base of the nose with Kleenex or a handkerchief. This can aggravate the swelling.  22. Avoid hitting or bumping your nose. It is wise not to  small children.  23. Avoid straining at the stool; you may take a laxative if needed. Any over the counter stool softener will do.  24. Avoid sunning of the face for one month.  25. Avoid sexual intercourse for 2 weeks after your surgery.  26. Do not tweeze the eyebrows for one week.  27. No swimming, strenuous athletic activity or exercises for 4 weeks.  28. Do not drive while taking any sedative or   pain medications.  29. Do not  try to remove the sutures yourself.  Things to report  1. Report any excessive bleeding that persists after applying pressure for 20 minutes.   2. Report any excessive swelling and /or pain.   3. Report any signs of infection such as excessive swelling, redness, sudden increase in pain or drainage, elevation in temperature, greater than (100.1 degrees).  4. Report development of any drug reaction. (Rash, hives or itching).  5. Report if a lump develops or becomes larger.  After office hours, on weekends or holidays please call the answering service. Let them know you are a post-op patient of Dr. Frankel's and they will page him. He will return your call directly.     Resuming Activities:  1. Glasses may be worn as soon as long as the splint remains on the nose. After that, they must be suspended from the forehead for a period of about 6 weeks. Do not rest glasses on nose without support.  2. Contacts may be inserted the day after surgery.  3. Wash the face gently with a mild soap or cleanser twice daily beginning the day after surgery.  4. Returning to work depends on the amount of physical activity and public contact your job involves and also the amount of swelling and discoloration you develop; the average patient may return to work or go out socially 8 days   after surgery when these factors are minimal. There is individual variation regarding the time   one returns to work.  Your first post-op office visit:   YOUR POST OP APPOINTMENT WAS SCHEDULED AT THE SAME TIME AS YOUR SURGERY WAS SCHEDULED. IF YOU NEED TO CONFIRM DATE AND TIME PLEASE CALL THE OFFICE [phone].  1. Eat a small meal prior to your appointment.  2. The cast and splints will be removed and the progress of your healing will be checked.  3. Splint and cast removal is a quick and   simple process that generally results in   minimal discomfort.  4. If you are concerned about discomfort, you   may take prescribed pain medication or   ibuprofen 45  minutes prior to the appointment.   If you take prescribed narcotic pain medication you will need a .   5. After all stitches have been removed or dissolve, the scars will appear a deep pink color. With   time, the pink will fade and become white, the firmness of the scar will soften, and they will become less noticeable.   6. Nasal swelling will progressively resolve over the first 3-6 months. Each individual varies with respect to healing, but it takes approximately an entire year for resolution of swelling and healing of any scar.      Daily Care  1.Ice compresses (do not use a bag of ice cubes, afrozen bag peas or a cold compress is acceptable)should be applied intermittently (20 minutes on/ 20minutes off) throughout the day while awake. Thiswill decrease bruising and swelling and will help withpain. You may continue to ice as needed after the72-hour summer.  2.Place wash towel in icy water, ring out excess  water and place over eyes. This will help withbruising and swelling.  3.You may use Afrin for post op bleeding for the first72 hours. Discontinue afrin use after day 3.  4.The Saline spray should be used 4 to 5 times whileawake every day until you return for your post-opvisit and splint removal.  5.Change mustache dressing only as needed. Try not to change it more than twice daily. May remove once bleeding has subsided.  6.You may bathe the next day after your nasal surgery. It is okay to get the nasal cast wet. If it becomes loose please notify the office.  7.Sleep on your back with your head elevated about 30-40 degrees. You are encouraged to sleep this   way for approximately 2-3 days to minimize bruising and swelling.  8.It is best to sleep alone for one or two weeks afteryour operation.  9.Eat soft foods for 2-3 days. I.E. (Jell-O, pudding,mashed potatoes). Chewing can cause an increase inpain and swelling.  10.Talking should be minimized. Excessive talking,laughing and chewing can cause increased  pain  and swelling.  11.To avoid an upset stomach, eat something beforetaking any medication.  12.Take the pain medicine prescribed as needed or ExtraStrength Tylenol and/or Ibuprofen as needed.  13.Continue taking your antibiotic as prescribed until  it's finished.  14.You can use Chap Stick or Vaseline for dry lips.  LAST DOSE OF PAIN MEDICATION:  _________________________________________________________________________________________________________________________________  RESUME TAKING ROUTINE MEDICTIONS:  ____________________________________________________________________________________________________________________________________________________________________________  [facility]  Post Op Pain Management  You will be given a prescription for pain medication inthe post-op unit (unless contraindicated Percocet or similar). Fill this prescription on the way home from   your surgery.   We recommend you start by taking 1 Percocet and 1 extra strength Tylenol (over the counter).   4-6 hours later if your pain is not adequately controlled you may take 2 Percocet (NO ADDITIONAL TYLENOL).   DO NOT take more than 4G (4000 mg) of Tylenol in a 24 hour period.   You may also take ibuprofen for additional pain relief. The appropriate dosage for ibuprofen is 10mg/ kg body weight (MAX dose is 800mg). You may take ibuprofen only every 6 hours; or, you may alternate ibuprofen with either 2 Percocet, or 1 Percocet + 1 Tylenol every 3 hours with the weight appropriate dosage of ibuprofen.    Dr Frankel Contact Information:   [facility]  [address] [place] [address]   [address]  [place] [address]  9 a.m. - 4 p.m. Monday - Friday  Phone: [phone]   Fax: [phone]  AFTER HOURS ANSWERING SERVICE:  [phone]     Dr Torres [phone]  Dr Torres after hours: [phone]    © 2020 [facility] [id number]   Scopalamine patch may stay on  for 72 hrs.  Remove patch,on Friday, discard away form pets, children.  Do not touch eyes!  Wash hands thoroughly     May have Tylenol after: 1:00 pm today

## 2024-05-01 NOTE — ANESTHESIA PROCEDURE NOTES
Airway  Date/Time: 5/1/2024 7:45 AM  Urgency: elective    Airway not difficult    Staffing  Performed: CRNA   Authorized by: Joana Foreman MD    Performed by: PHILIPP Fairchild-SHADI  Patient location during procedure: OR    Indications and Patient Condition  Indications for airway management: anesthesia and airway protection  Spontaneous ventilation: present  Sedation level: deep  Preoxygenated: yes  Patient position: sniffing  MILS maintained throughout  Mask difficulty assessment: 1 - vent by mask    Final Airway Details  Final airway type: endotracheal airway      Successful airway: ETT  Cuffed: yes   Successful intubation technique: direct laryngoscopy  Blade: Dave  Blade size: #4  ETT size (mm): 7.5  Cormack-Lehane Classification: grade I - full view of glottis  Placement verified by: chest auscultation and capnometry   Measured from: lips  ETT to lips (cm): 24  Number of attempts at approach: 1

## 2024-05-01 NOTE — OP NOTE
Bilateral endoscopic sinus surgery operative note     Date: 2024  [place] [facility] [place] [facility] [place]    Name: Edward Joseph, : 1995, Age: 28 y.o., MRN: [id number], Sex: male    Diagnosis  Pre-op Diagnosis     * Nasal obstruction [J34.89]     * Deviated nasal septum [J34.2]     * Hypertrophy of inferior nasal turbinate [J34.3]     * Nasal valve stenosis [J34.89]     * Chronic ethmoidal sinusitis [J32.2]     * Chronic maxillary sinusitis [J32.0] Post-op Diagnosis     * Nasal obstruction [J34.89]     * Deviated nasal septum [J34.2]     * Hypertrophy of inferior nasal turbinate [J34.3]     * Nasal valve stenosis [J34.89]     * Chronic ethmoidal sinusitis [J32.2]     * Chronic maxillary sinusitis [J32.0]     Procedures    1.  Bilateral nasal endoscopy with total ethmoidectomy including frontal sinusotomy  2.  Bilateral maxillary endoscopy with tissue removal  3.  Extracranial CT image guidance    Surgeons   Panel 1:     * Jonathan K Frankel - Primary  Panel 2:     * Remy Torres - Primary    Resident/Fellow/Other Assistant:  None    Procedure Summary  Anesthesia: General  ASA: I  Anesthesia Staff: Anesthesiologist: Joana Foreman MD  CRNA: PHILIPP Fairchild-CRNA  Estimated Blood Loss: 50mL  Intra-op Medications:   Administrations occurring from 0730 to 1215 on 24:   Medication Name Total Dose   lidocaine-epinephrine (Xylocaine W/EPI) 1 %-1:100,000 injection 4 mL   oxymetazoline (Afrin) 0.05 % nasal spray 3 spray              Anesthesia Record               Intraprocedure I/O Totals          Intake    Propofol Drip 0.00 mL    The total shown is the total volume documented since Anesthesia Start was filed.    Total Intake 0 mL          Specimen:   ID Type Source Tests Collected by Time   1 : MICRO DEBRIDER SINUS CONTENTS BILATERAL Tissue SINUS CONTENTS BILATERAL SURGICAL PATHOLOGY EXAM Remy Torres MD 2024 0842        Staff:   Circulator: Sveta Cobos RN  Scrub Person: Aleida  Watson    Findings:   1.  Inflammatory changes noted throughout dissected paranasal sinuses  2.  No absorbable or nonabsorbable packing was placed following the sinus component  3.  Each middle turbinate was preserved    Indications: Edward Joseph is a 28-year-old male who presents with persistence of sinonasal symptoms despite adequate medical therapy.  He had undergone previous septal surgery with persistence of nasal airway obstruction symptomatology.  Given this complexity, I asked the patient to be evaluated by my plastic surgery partner, Dr. Frankel, for consideration of revision nasal airway surgery including about component.  He was deemed a surgical candidate and we now proceed to the operating room for combination procedure with Dr. Frankel performed a nasal airway component I will perform the sinus component.    Procedure Details:   After informed consent was attained and all questions were answered patient was brought to the operating room and placed supine on the operating room table.  General anesthesia was induced by the anesthesia staff and the patient was orally intubated.  The table was turned 90 degrees.  Afrin-soaked pledgets were placed within both the patient's nasal cavities.    The CT image guidance system was brought to the field.  The CT data was uploaded, reviewed, and a plan was finalized.  The headset was attached to the patient.  The image guidance was registered accurate in 3 separate orientations and was used throughout the surgical procedure to identify critical landmarks such as the borders of the orbit as well as the ethmoid skull base.  The patient was then prepped and draped in a standard fashion for endonasal surgery.    Bilateral maxillary endoscopy with tissue removal was performed.  Bilateral nasal endoscopy demonstrated inferior turbinate hypertrophy.  He had a mid septal perforation with clean edges.  1% lidocaine with 1:100,000 epinephrine was injected into the uncinate  process bilaterally as well as the axilla of each middle turbinate.  Each middle turbinate was medialized and each uncinate process was identified and resected.  Each maxillary sinus was opened from the lateral bone anteriorly to the posterior wall of each maxillary sinus posteriorly and the natural drainage pathway was incorporated on each side.  Edematous mucosa within each maxillary was debrided but there was no purulence noted.    Bilateral nasal endoscopy with total ethmoidectomy including frontal sinusotomy was performed.  Bilaterally anterior and posterior ethmoid air cells adjacent to the lamina and along the ethmoid skull base were widely removed.  The base lamella was traversed and the superior turbinate was identified and preserved.  Posterior ethmoid air cells were removed to the sphenoid face into the ethmoid skull base.  The anterior nor posterior ethmoid arteries were encountered during this dissection.  Remnant anterior ethmoid air cells within each frontal drainage pathway were identified and removed and each frontal sinus was cannulated and opened widely in all orientations.  Edematous mucosa throughout the ethmoid cavity extending into each frontal was debrided widely.  There was no purulence.    This concluded the sinus component of the procedure.  The patient was turned over to Dr. Frankel for the nasal airway component.    Complications:  None; patient tolerated the procedure well.    Disposition: PACU - hemodynamically stable.  Condition: stable     Attending Attestation: I performed the procedure.    Remy Torres MD

## 2024-05-01 NOTE — H&P
History Of Present Illness  Edward Joseph is a 28 y.o. male presenting with persistent sinonasal symptoms despite adequate medical therapy.     Past Medical History  He has a past medical history of Deviated nasal septum.    Surgical History  He has a past surgical history that includes Tonsillectomy; Adenoidectomy; and [place] tooth extraction.     Social History  He reports that he has never smoked. He has never used smokeless tobacco. He reports current alcohol use. Drug use questions deferred to the physician.    Family History  Family History   Family history unknown: Yes     Allergies  Penicillin    Review of Systems  The remainder of a full 10 systems review of systems was pan negative outside of that stated in the history of present illness.     Physical Exam  Alert and oriented x 3  No stridor     Last Recorded Vitals  Blood pressure 126/65, pulse 62, temperature 36.4 °C (97.5 °F), temperature source Temporal, resp. rate 16, weight 93.6 kg (206 lb 5.6 oz), SpO2 98%.    Assessment/Plan   Active Problems:    Nasal obstruction    Deviated nasal septum    Hypertrophy of inferior nasal turbinate    Nasal valve stenosis    Chronic ethmoidal sinusitis    Chronic maxillary sinusitis    Plan for endoscopic sinus surgery with Dr. Torres and nasal airway surgery with Dr. Frankel.    Remy Torres MD

## 2024-05-01 NOTE — OP NOTE
SEPTO_RHINOPLASTY, INFERIOR TURBINATE REDUCTION, REPAIR NASAL VALVES, NASAL ENDOCSOPY   with  IRRADIATED CARTILAGE GRAFT (B) Operative Note     Date: 2024  OR Location: [facility] OR    Name: Edward Joseph, : 1995, Age: 28 y.o., MRN: [id number], Sex: male    Diagnosis  Pre-op Diagnosis     * Nasal obstruction [J34.89]     * Deviated nasal septum [J34.2]     * Hypertrophy of inferior nasal turbinate [J34.3]     * Nasal valve stenosis [J34.89]     * Chronic ethmoidal sinusitis [J32.2]     * Chronic maxillary sinusitis [J32.0] Post-op Diagnosis     * Nasal obstruction [J34.89]     * Deviated nasal septum [J34.2]     * Hypertrophy of inferior nasal turbinate [J34.3]     * Nasal valve stenosis [J34.89]     * Chronic ethmoidal sinusitis [J32.2]     * Chronic maxillary sinusitis [J32.0]     Procedures  SEPTO_RHINOPLASTY, INFERIOR TURBINATE REDUCTION, REPAIR NASAL VALVES, NASAL ENDOCSOPY   with  IRRADIATED CARTILAGE GRAFT  03939 - ID NASAL ENDOSCOPY DIAGNOSTIC UNI/BI SPX    SEPTO_RHINOPLASTY, INFERIOR TURBINATE REDUCTION, REPAIR NASAL VALVES, NASAL ENDOCSOPY   with  IRRADIATED CARTILAGE GRAFT  [id number] - ID SEPTOPLASTY/SUBMUCOUS RESECJ W/WO CARTILAGE GRF    SEPTO_RHINOPLASTY, INFERIOR TURBINATE REDUCTION, REPAIR NASAL VALVES, NASAL ENDOCSOPY   with  IRRADIATED CARTILAGE GRAFT  [id number] - ID SUBMUCOUS RESCJ INFERIOR TURBINATE PRTL/COMPL    SEPTO_RHINOPLASTY, INFERIOR TURBINATE REDUCTION, REPAIR NASAL VALVES, NASAL ENDOCSOPY   with  IRRADIATED CARTILAGE GRAFT  [id number] - ID REPAIR NASAL VESTIBULAR STENOSIS    SEPTO_RHINOPLASTY, INFERIOR TURBINATE REDUCTION, REPAIR NASAL VALVES, NASAL ENDOCSOPY   with  IRRADIATED CARTILAGE GRAFT  [id number] - ID CARTILAGE GRAFT COSTOCHONDRAL      Surgeons   Panel 1:     * Jonathan K Frankel - Primary  Panel 2:     * Remy Torres - Primary    Resident/Fellow/Other Assistant:  Surgeons and Role:  * No surgeons found with a matching role *    Procedure Summary  Anesthesia: General  ASA:  I  Anesthesia Staff: Anesthesiologist: Joana Foreman MD  CRNA: PHILIPP Fairchild-CRNA  Estimated Blood Loss: 20 mL  Intra-op Medications:   Administrations occurring from 0730 to 1215 on 05/01/24:   Medication Name Total Dose   tranexamic acid (Cyklokapron) injection 1 g   lidocaine-epinephrine (Xylocaine W/EPI) 1 %-1:100,000 injection 4 mL   BUPivacaine-EPINEPHrine (Marcaine w/EPI) 0.5 %-1:200,000 injection 10 mL   sodium chloride 0.9 % irrigation solution 250 mL   mupirocin (Bactroban) 2 % ointment 1 Application   oxymetazoline (Afrin) 0.05 % nasal spray 3 spray   povidone-iodine 5 % ophthalmic solution 1 Application              Anesthesia Record               Intraprocedure I/O Totals          Intake    Propofol Drip 0.00 mL    The total shown is the total volume documented since Anesthesia Start was filed.    Total Intake 0 mL          Specimen:   ID Type Source Tests Collected by Time   1 : MICRO DEBRIDER SINUS CONTENTS BILATERAL Tissue SINUS CONTENTS BILATERAL SURGICAL PATHOLOGY EXAM Remy Torres MD 5/1/2024 0842        Staff:   Circulator: Sveta Cobos RN  Relief Circulator: Sima Rogers RN  Relief Scrub: Radha Sims RN  Scrub Person: Aleida Watson         Drains and/or Catheters: * None in log *    Tourniquet Times:         Implants:  Implants       Type Name Action Serial No.      Implant COSTAL CARTILAGE SHEET, LARGE - [id number] - [id number] Implanted [id number]              Findings: Caudal and dorsal septal deviation.  Evidence of prior septoplasty with scar formation resulting in a more complex septoplasty required more time and skill than the typical septoplasty.  Fracture through the dorsal strut.  Nasal valve narrowing.  Bilateral inferior turbinate hypertrophy.    Indications: Edward Joseph is an 28 y.o. male who is having surgery for Nasal obstruction [J34.89]Deviated nasal septum [J34.2]Hypertrophy of nasal turbinates [J34.3]Nasal valve stenosis  [J34.89].     The patient was seen in the preoperative area. The risks, benefits, complications, treatment options, non-operative alternatives, expected recovery and outcomes were discussed with the patient. The possibilities of reaction to medication, pulmonary aspiration, injury to surrounding structures, bleeding, recurrent infection, the need for additional procedures, failure to diagnose a condition, and creating a complication requiring transfusion or operation were discussed with the patient. The patient concurred with the proposed plan, giving informed consent.  The site of surgery was properly noted/marked if necessary per policy. The patient has been actively warmed in preoperative area. Preoperative antibiotics have been ordered and given within 1 hours of incision. Venous thrombosis prophylaxis have been ordered including bilateral sequential compression devices    Procedure Details:     SURGICAL INDICATIONS: The patient elected to proceed with above stated procedures after risks, benefits, and alternatives of the procedure were discussed in detail to the patient in the clinic in preoperative setting.  All questions were thoroughly addressed.  No guarantees were given.           OPERATIVE REPORT: Dr. Torres performed bilateral functional endoscopic sinus surgery.  Please see his note for further details.  I entered the room at the conclusion of his portion of the procedure.  Timeout was again performed.  1% lidocaine with 1:100,000 units of epinephrine were used to inject the nose in standard fashion after the planned sites of incision and surgical markings were made.     A 0-degree Tavarez maurilio was used to perform bilateral nasal endoscopy with the above stated findings.  A 2-mm inferior turbinate blade was used to make a stab incision at the anterior head of the inferior turbinate.  Submucosal dissection and microdebridement was performed. The Boies elevator was used to outfracture the inferior  turbinate.  The same was performed on the contralateral side.     A marginal incision was made with a #15 blade.  Curved iris scissors were used to perform sharp and blunt dissection to skeletonize the lower lateral cartilage.  The same was performed on the contralateral side.  A #15 blade was used to make the columellar incision.  The columellar flap was raised and connected to the marginal incision.  The soft tissue envelope was raised to the level of the nasal bones.       The anterior septal angle was identified.  Dissection was then performed to raise bilateral mucoperichondrial flaps.  The upper lateral cartilages were  from the dorsal septum using a #15 blade.  This dissection took more than the typical amount of time given that the prior surgery.    2 mm of the inferior aspect of the caudal septum was resection and a swinging door maneuver was performed.  The inferior caudal septum was secured to the periosteum overlying the nasal spine with 5-0 PDS and 5-0 nylon suture.    The patient had insufficient quadrangular cartilage to use for grafting.  The decision was made to use homologous possible cartilage.    The prior fracture site of the dorsal septum was incised to allow for straightening.    A straight segment of ethmoid bone was perforated with an 18-gauge needle and secured to the deviated portion of the dorsal strut with 5-0 PDS suture.     grafts were fashioned from the homologous cartilage the  grafts and the upper lateral cartilages were secured to the dorsal septal cartilage with 5-0 PDS suture in a horizontal mattress fashion.    A columellar strut graft was placed in a pocket between the medial crura and used to suture the medial crura with 5-0 PDS suture in a horizontal mattress fashion.    Interdomal suture was placed with 5-0 PDS suture.    The soft tissue envelope was lowered and the nose was inspected for additional deformities.         The columellar incision was  closed with 5-0 fast-absorbing gut sutures in simple interrupted fashion.  A 5-0 chromic gut suture was used to close the marginal incision in a simple interrupted fashion.       Olsen splints were coated with bacitracin and placed in appropriate position.  The Olsen splints were secured with 3-0 Prolene suture.  The nasal dressing was then applied with Mastisol, half-inch micropore tape, and Aquaplast in standard fashion.  This concluded the procedure.  The patient was weaned from sedation, extubated, and returned to the PACU in stable condition.  There were no complications of the procedure and it was tolerated well by the patient.  Postoperative instructions were given to the patient and reviewed prior to surgery.  All prescriptions were given in the postoperative area.       Complications:  None; patient tolerated the procedure well.    Disposition: PACU - hemodynamically stable.  Condition: stable       Attending Attestation: GREGG performed the procedure.    Jonathan K Frankel  Phone Number: [phone]

## 2024-05-01 NOTE — ANESTHESIA PREPROCEDURE EVALUATION
Patient: Edward Joseph    Procedure Information       Date/Time: 05/01/24 0730    Procedures:       SEPTO_RHINOPLASTY, INFERIOR TURBINATE REDUCTION, REPAIR NASAL VALVES, NASAL ENDOCSOPY   POSS EAR vs RIB vs IRRADIATED CARTILAGE (Bilateral) - *COMBO SURGERY WITH DR. TORRES* DR. FRANKEL WILL GO SECOND. REQUESTING 2.5HRS      Bilateral endoscopic sinus surgery with image guidance (Bilateral)      Nasal Endoscopy with Excision Tissue Maxillary Sinus (Bilateral)      Navigation-Assisted Surgery    [place] [facility] [place] 04 / [facility] [place]    Surgeons: Jonathan K Frankel, MD; Remy Torres MD          29 y/o without significant PMH, here for septorhinoplasty.    Relevant Problems   HEENT   (+) Chronic ethmoidal sinusitis   (+) Chronic maxillary sinusitis   (+) Other chronic sinusitis      Skin   (+) Rash of unknown cause       Clinical information reviewed:   Tobacco  Allergies  Meds   Med Hx  Surg Hx   Fam Hx  Soc Hx        NPO Detail:  NPO/Void Status  Date of Last Liquid: 04/30/24  Time of Last Liquid: 2100  Date of Last Solid: 04/30/24  Time of Last Solid: 2100         Vitals:    05/01/24 0700   BP: 126/65   Pulse: 62   Resp: 16   Temp: 36.4 °C (97.5 °F)   SpO2: 98%       Past Surgical History:   Procedure Laterality Date    ADENOIDECTOMY      TONSILLECTOMY      WISDOM TOOTH EXTRACTION       Past Medical History:   Diagnosis Date    Deviated nasal septum      No current facility-administered medications for this encounter.  Allergies   Allergen Reactions    Penicillin Hives     Social History     Tobacco Use    Smoking status: Never    Smokeless tobacco: Never   Substance Use Topics    Alcohol use: Yes     Comment: social         Chemistry    Lab Results   Component Value Date/Time     03/16/2020 1721    K 4.2 03/16/2020 1721     03/16/2020 1721    CO2 29 03/16/2020 1721    BUN 14 03/16/2020 1721    CREATININE 1.14 03/16/2020 1721    Lab Results   Component Value Date/Time     CALCIUM 10.1 03/16/2020 1721    ALKPHOS 47 03/16/2020 1721    AST 18 03/16/2020 1721    ALT 18 03/16/2020 1721    BILITOT 0.9 03/16/2020 1721          Lab Results   Component Value Date/Time    WBC 6.5 03/16/2020 1721    HGB 17.0 03/16/2020 1721    HCT 51.7 03/16/2020 1721     03/16/2020 1721         NPO Detail:  NPO/Void Status  Date of Last Liquid: 04/30/24  Time of Last Liquid: 2100  Date of Last Solid: 04/30/24  Time of Last Solid: 2100         Review of Systems    Physical Exam    Airway  Mallampati: II  TM distance: >3 FB  Neck ROM: full     Cardiovascular   Rhythm: regular  Rate: normal     Dental    Pulmonary - normal exam     Abdominal - normal exam             Anesthesia Plan    History of general anesthesia?: yes  History of complications of general anesthesia?: no    ASA 1     general     The patient is not a current smoker.    intravenous induction   Anesthetic plan and risks discussed with patient and mother.    Plan discussed with CRNA and attending.

## 2024-05-01 NOTE — ANESTHESIA POSTPROCEDURE EVALUATION
Patient: Edward Joseph    Procedure Summary       Date: 05/01/24 [facility]    Anesthesia Start: 0736 Anesthesia Stop: 1240    Procedures:       SEPTO_RHINOPLASTY, INFERIOR TURBINATE REDUCTION, REPAIR NASAL VALVES, NASAL ENDOCSOPY   with  IRRADIATED CARTILAGE GRAFT (Bilateral)      Bilateral endoscopic sinus surgery with image guidance (Bilateral)      Nasal Endoscopy with Excision Tissue Maxillary Sinus (Bilateral)      Navigation-Assisted Surgery Diagnosis:       Nasal obstruction      Deviated nasal septum      Hypertrophy of inferior nasal turbinate      Nasal valve stenosis      Chronic ethmoidal sinusitis      Chronic maxillary sinusitis      (Nasal obstruction [J34.89]Deviated nasal septum [J34.2]Hypertrophy of nasal turbinates [J34.3]Nasal valve stenosis [J34.89])    Surgeons: Jonathan K Frankel, MD; Remy Torres MD Responsible Provider: Joana Foreman MD    Anesthesia Type: general ASA Status: 1            Anesthesia Type: general    Vitals Value Taken Time   /75 05/01/24 1345   Temp 36.9 °C (98.4 °F) 05/01/24 1345   Pulse 91 05/01/24 1345   Resp 16 05/01/24 1345   SpO2 93 % 05/01/24 1345       Anesthesia Post Evaluation    Patient location during evaluation: PACU  Patient participation: complete - patient participated  Level of consciousness: awake and alert  Pain management: adequate  Airway patency: patent  Cardiovascular status: acceptable  Respiratory status: acceptable  Hydration status: acceptable  Postoperative Nausea and Vomiting: none        No notable events documented.

## 2024-05-02 ENCOUNTER — TELEPHONE (OUTPATIENT)
Dept: OTOLARYNGOLOGY | Facility: CLINIC | Age: 29
End: 2024-05-02
Payer: COMMERCIAL

## 2024-05-02 NOTE — TELEPHONE ENCOUNTER
Irwin is post-op day 1, s/p Combo surgery open-septo-rhinoplasty and Sinus Surgery performed at [facility] on 5/1/24. I called him today to check on the status of his recovery, he c/o mild pain and nasal congestion but states this is tolerable and he is doing well otherwise. He denies any other complication or concern at this time. Patient reminded of post op visit scheduled on 5/7/24. Dr. Frankel updated on patient condition and patient encouraged to call office with any problems or concerns.

## 2024-05-07 ENCOUNTER — CLINICAL SUPPORT (OUTPATIENT)
Dept: OTOLARYNGOLOGY | Facility: CLINIC | Age: 29
End: 2024-05-07
Payer: COMMERCIAL

## 2024-05-07 ENCOUNTER — OFFICE VISIT (OUTPATIENT)
Dept: OTOLARYNGOLOGY | Facility: CLINIC | Age: 29
End: 2024-05-07
Payer: COMMERCIAL

## 2024-05-07 VITALS — SYSTOLIC BLOOD PRESSURE: 150 MMHG | TEMPERATURE: 98.1 F | DIASTOLIC BLOOD PRESSURE: 89 MMHG

## 2024-05-07 VITALS — BODY MASS INDEX: 27.17 KG/M2 | WEIGHT: 205 LBS | HEIGHT: 73 IN

## 2024-05-07 DIAGNOSIS — J34.2 NASAL SEPTAL DEVIATION: ICD-10-CM

## 2024-05-07 DIAGNOSIS — J32.2 CHRONIC ETHMOIDAL SINUSITIS: ICD-10-CM

## 2024-05-07 DIAGNOSIS — J32.0 CHRONIC MAXILLARY SINUSITIS: Primary | ICD-10-CM

## 2024-05-07 DIAGNOSIS — J34.89 NASAL OBSTRUCTION: ICD-10-CM

## 2024-05-07 DIAGNOSIS — J34.89 NASAL VALVE STENOSIS: ICD-10-CM

## 2024-05-07 PROCEDURE — 1036F TOBACCO NON-USER: CPT | Performed by: OTOLARYNGOLOGY

## 2024-05-07 PROCEDURE — 99024 POSTOP FOLLOW-UP VISIT: CPT | Performed by: OTOLARYNGOLOGY

## 2024-05-07 PROCEDURE — 31237 NSL/SINS NDSC SURG BX POLYPC: CPT | Performed by: OTOLARYNGOLOGY

## 2024-05-07 RX ORDER — METHYLPREDNISOLONE 4 MG/1
TABLET ORAL
Qty: 21 TABLET | Refills: 0 | Status: SHIPPED | OUTPATIENT
Start: 2024-05-07 | End: 2024-05-14

## 2024-05-07 NOTE — PROGRESS NOTES
The Olsen splint suture was cut and the splints were removed.  The nasal dressing was then gently removed.  Breathing well through both nasal cavities.  Has had bloody drainage, cleared in office.  Appropriate edema.    He is very happy with the functional outcomes.  Denies any questions or concerns at this time.    Instructions and postoperative care including limitations reviewed with the patient. Discussed expected post-op healing course.  Will continue nasal saline spray.   Verbalized understanding and denies questions/ concerns at this time.  Follow-up in 3 weeks, earlier as needed.

## 2024-05-07 NOTE — PROGRESS NOTES
Chief Complaint:  1.  Chronic sinusitis  2.  Nasal airway obstruction; residual septal deviation to the right following septoplasty, septal perforation  3.  Postnasal drainage  4.  Facial pain and pressure, headaches  5.  Epistaxis    History Of Present Illness:  Reason for this visit:    Edward Joseph presents for his first postoperative evaluation since undergoing revision nasal airway surgery with Dr. Frankel in sinus surgery with me May 1, 2024.  In the immediate postoperative setting he had some bleeding but this is since improved.  He has been using saline spray and Afrin consistently.  He had his nasal packing removed today by Dr. Frankel's team.     Active Problems:  Patient Active Problem List   Diagnosis    Rash of unknown cause    Nasal septal perforation    Nasal septal deviation    Nasal valve collapse    Nasal obstruction    Other chronic sinusitis    Deviated nasal septum    Hypertrophy of inferior nasal turbinate    Nasal valve stenosis    Chronic ethmoidal sinusitis    Chronic maxillary sinusitis      Past Medical History:  He has a past medical history of Deviated nasal septum.    Surgical History:  He has a past surgical history that includes Tonsillectomy; Adenoidectomy; and [place] tooth extraction.     Family History:  Family History   Family history unknown: Yes     Social History:  He reports that he has never smoked. He has never used smokeless tobacco. He reports current alcohol use. Drug use questions deferred to the physician.     Allergies:  Penicillin    Current Meds:    Current Outpatient Medications:     docusate sodium (Colace) 100 mg capsule, Take 1 capsule (100 mg) by mouth 2 times a day as needed for constipation. While taking narcotics (Patient not taking: Reported on 5/7/2024), Disp: 60 capsule, Rfl: 0    fluticasone (Flonase) 50 mcg/actuation nasal spray, Administer 2 sprays into each nostril once daily. Shake gently. Before first use, prime pump. Spray 1 spray in each  nostril twice daily. After use, clean tip and replace cap., Disp: 16 g, Rfl: 3    ondansetron (Zofran) 4 mg tablet, Take 1 tablet (4 mg) by mouth every 6 hours if needed for nausea or vomiting for up to 7 days. (Patient not taking: Reported on 5/7/2024), Disp: 10 tablet, Rfl: 0    oxyCODONE-acetaminophen (Percocet) 5-325 mg tablet, Take 2 tablets by mouth every 6 hours if needed for severe pain (7 - 10). (Patient not taking: Reported on 5/7/2024), Disp: 20 tablet, Rfl: 0    oxymetazoline 0.05 % nasal spray, Administer 2 sprays into each nostril if needed (Nose Bleed) for up to 3 days. Do not use for more than 3 days., Disp: 15 mL, Rfl: 0    sodium chloride (Saline Mist) 0.65 % nasal spray, Administer 2 sprays into each nostril every 2 hours while awake., Disp: 44 mL, Rfl: 3    Vitals:  Visit Vitals  Smoking Status Never      Physical Exam:  Nose: On external exam there are neither lesions nor asymmetry of the nasal tip/dorsum. On anterior rhinoscopy, visualization posteriorly is limited on anterior examination. For this reason, to adequately evaluate posteriorly for masses, source of epistaxis, polypoid disease, debridement, and/or signs of infections, nasal endoscopy is indicated.  (Please see procedure below.)    SINONASAL ENDOSCOPY WITH DEBRIDEMENT (CPT 27486-87):  Due to the patient's chronic sinusitis/chronic rhinitis, sinonasal endoscopy with debridement is indicated. After discussion of risks and benefits, and topical decongestion and anesthesia, an endoscope was used to perform nasal endoscopy with debridement.  A timeout identifying the patient, the procedure, and any concerns was performed prior to beginning the procedure.    Findings:  Examination of the right nasal cavity revealed significant edema through his nasal valve and through the anterior aspect of his nasal cavity.  It was decongested with anesthetized and decongested with Afrin with lidocaine both sprayed and placed on pledgets.  After the  decongestion had been accomplished, I was able to visualize to the posterior aspect of the nasal cavity and remove blood product with a suction.  I was then able to visualize the base of the middle turbinate and with the suction anterior the maxillary sinus and ethmoid cavity I removed debris throughout its length.  Debris was removed from the base of the frontal drainage pathway.  Examination of the left nasal cavity revealed more significant mucosal edema anteriorly limiting my view posteriorly.  I was able to decongest this area and visualize the base of the middle turbinate and into the maxillary sinus and ethmoid cavity with a malleable suction and remove debris from that level.  I could not directly visualize the anterior ethmoid cavity or frontal drainage pathway today.  There was no pus bilaterally.  He tolerated the procedure well.    Provider Impressions:  1.  Chronic sinusitis s/p bilateral endoscopic sinus surgery 5/1/24  2.  Nasal airway obstruction s/p revision nasal airway surgery with Dr. Frankel May 1, 2024  3.  Postnasal drainage  4.  Facial pain and pressure, headaches  5.  Epistaxis    Discussion:  Edward Joseph appeared well on examination today.  The swelling that was noted on exam is very common following valve surgery and I reassured him.  I asked him to use saline spray consistently and I also provided him with a Medrol Dosepak.  We discussed common side effects and I recommended discontinuation for any issues.  I asked him to continue to avoid strenuous activity and follow-up with me in 1 week.  He was amenable to this and all questions were answered.    Signature:  Scribe Attestation  By signing my name below, IRula Scribe   attest that this documentation has been prepared under the direction and in the presence of Remy Torres MD.

## 2024-05-08 LAB
LABORATORY COMMENT REPORT: NORMAL
PATH REPORT.FINAL DX SPEC: NORMAL
PATH REPORT.GROSS SPEC: NORMAL
PATH REPORT.MICROSCOPIC SPEC OTHER STN: NORMAL
PATH REPORT.RELEVANT HX SPEC: NORMAL
PATH REPORT.TOTAL CANCER: NORMAL

## 2024-05-13 ENCOUNTER — OFFICE VISIT (OUTPATIENT)
Dept: OTOLARYNGOLOGY | Facility: CLINIC | Age: 29
End: 2024-05-13
Payer: COMMERCIAL

## 2024-05-13 VITALS — WEIGHT: 201.9 LBS | HEIGHT: 73 IN | TEMPERATURE: 97.3 F | BODY MASS INDEX: 26.76 KG/M2

## 2024-05-13 DIAGNOSIS — J32.0 CHRONIC MAXILLARY SINUSITIS: Primary | ICD-10-CM

## 2024-05-13 DIAGNOSIS — J32.2 CHRONIC ETHMOIDAL SINUSITIS: ICD-10-CM

## 2024-05-13 PROCEDURE — 31237 NSL/SINS NDSC SURG BX POLYPC: CPT | Performed by: OTOLARYNGOLOGY

## 2024-05-13 PROCEDURE — 1036F TOBACCO NON-USER: CPT | Performed by: OTOLARYNGOLOGY

## 2024-05-13 PROCEDURE — 99024 POSTOP FOLLOW-UP VISIT: CPT | Performed by: OTOLARYNGOLOGY

## 2024-05-13 NOTE — PROGRESS NOTES
Chief Complaint:  1.  Chronic sinusitis s/p bilateral endoscopic sinus surgery 5/1/24  2.  Nasal airway obstruction s/p revision nasal airway surgery with Dr. Frankel May 1, 2024  3.  Postnasal drainage  4.  Facial pain and pressure, headaches  5.  Epistaxis    History Of Present Illness:  Reason for this visit:    Edward Joseph presents for second postoperative evaluation.  His symptoms have been steadily improving.  He denies significant nasal bleeding.  He is using saline spray multiple times per day.       Active Problems:  Patient Active Problem List   Diagnosis    Rash of unknown cause    Nasal septal perforation    Nasal septal deviation    Nasal valve collapse    Nasal obstruction    Other chronic sinusitis    Deviated nasal septum    Hypertrophy of inferior nasal turbinate    Nasal valve stenosis    Chronic ethmoidal sinusitis    Chronic maxillary sinusitis        Past Medical History:  He has a past medical history of Deviated nasal septum.    Surgical History:  He has a past surgical history that includes Tonsillectomy; Adenoidectomy; and [place] tooth extraction.     Family History:  Family History   Family history unknown: Yes       Social History:  He reports that he has never smoked. He has never used smokeless tobacco. He reports current alcohol use. Drug use questions deferred to the physician.     Allergies:  Penicillin    Current Meds:    Current Outpatient Medications:     oxymetazoline 0.05 % nasal spray, Administer 2 sprays into each nostril if needed (Nose Bleed) for up to 3 days. Do not use for more than 3 days., Disp: 15 mL, Rfl: 0    sodium chloride (Saline Mist) 0.65 % nasal spray, Administer 2 sprays into each nostril every 2 hours while awake., Disp: 44 mL, Rfl: 3    docusate sodium (Colace) 100 mg capsule, Take 1 capsule (100 mg) by mouth 2 times a day as needed for constipation. While taking narcotics (Patient not taking: Reported on 5/7/2024), Disp: 60 capsule, Rfl: 0    fluticasone  "(Flonase) 50 mcg/actuation nasal spray, Administer 2 sprays into each nostril once daily. Shake gently. Before first use, prime pump. Spray 1 spray in each nostril twice daily. After use, clean tip and replace cap., Disp: 16 g, Rfl: 3    methylPREDNISolone (Medrol Dospak) 4 mg tablets, Follow schedule on package instructions (Patient not taking: Reported on 5/13/2024), Disp: 21 tablet, Rfl: 0    oxyCODONE-acetaminophen (Percocet) 5-325 mg tablet, Take 2 tablets by mouth every 6 hours if needed for severe pain (7 - 10). (Patient not taking: Reported on 5/7/2024), Disp: 20 tablet, Rfl: 0    Vitals:  Visit Vitals  Temp 36.3 °C (97.3 °F)   Ht 1.854 m (6' 1\")   Wt 91.6 kg (201 lb 14.4 oz)   BMI 26.64 kg/m²   Smoking Status Never   BSA 2.17 m²        Physical Exam:  Nose: On external exam there are neither lesions nor asymmetry of the nasal tip/dorsum. On anterior rhinoscopy, visualization posteriorly is limited on anterior examination. For this reason, to adequately evaluate posteriorly for masses, source of epistaxis, polypoid disease, debridement, and/or signs of infections, nasal endoscopy is indicated.  (Please see procedure below.)    SINONASAL ENDOSCOPY WITH DEBRIDEMENT (CPT 24465-67):  Due to the patient's chronic sinusitis/chronic rhinitis, sinonasal endoscopy with debridement is indicated. After discussion of risks and benefits, and topical decongestion and anesthesia, an endoscope was used to perform nasal endoscopy with debridement.  A timeout identifying the patient, the procedure, and any concerns was performed prior to beginning the procedure.    Findings:  Examination of the right nasal cavity revealed 1 large crust within the nasal cavity and another within the ethmoid cavity that was completely removed with an alligator.  Following this, mucoid debris was evacuated from the posterior ethmoid cavity with a suction.  The maxillary, ethmoid, and frontal were all widely patent after debridement.  Examination " of the left nasal cavity revealed 1 large crust within the ethmoid cavity that was removed with an alligator.  The maxillary, ethmoid, and frontal were all widely patent.  There was no pus bilaterally.  He tolerated the procedure very well.    Provider Impressions:  1.  Chronic sinusitis s/p bilateral endoscopic sinus surgery 5/1/24  2.  Nasal airway obstruction s/p revision nasal airway surgery with Dr. Frankel May 1, 2024  3.  Postnasal drainage  4.  Facial pain and pressure, headaches    Discussion:  Edward Joseph appeared well today.  I recommended avoidance of strenuous activity for the next week and then he can slowly resume his normal activity.  I asked him to continue utilizing saline spray multiple times per day.  I asked him to follow-up with me in about 6 to 8 weeks and we can discuss long-term medical therapy at that time.  All questions were answered.    Signature:  Scribe Attestation  By signing my name below, IRula Scribe   attest that this documentation has been prepared under the direction and in the presence of Remy Torres MD.

## 2024-06-20 ENCOUNTER — APPOINTMENT (OUTPATIENT)
Dept: OTOLARYNGOLOGY | Facility: CLINIC | Age: 29
End: 2024-06-20
Payer: COMMERCIAL

## 2024-07-11 ENCOUNTER — APPOINTMENT (OUTPATIENT)
Dept: OTOLARYNGOLOGY | Facility: CLINIC | Age: 29
End: 2024-07-11
Payer: COMMERCIAL

## 2024-07-11 VITALS — WEIGHT: 205.4 LBS | HEIGHT: 73 IN | BODY MASS INDEX: 27.22 KG/M2

## 2024-07-11 DIAGNOSIS — J34.89 NASAL VALVE STENOSIS: Primary | ICD-10-CM

## 2024-07-11 DIAGNOSIS — R09.81 NASAL CONGESTION: ICD-10-CM

## 2024-07-11 PROCEDURE — 99024 POSTOP FOLLOW-UP VISIT: CPT | Performed by: OTOLARYNGOLOGY

## 2024-07-11 PROCEDURE — 1036F TOBACCO NON-USER: CPT | Performed by: OTOLARYNGOLOGY

## 2024-07-11 NOTE — PROGRESS NOTES
Patient is following up from nasal airway surgery and combination with Dr. Torres who performed bilateral functional endoscopic sinus surgery.  Patient states he is overall doing very well, breathing well through both sides of his nose.  He does state that he has difficulty clearing his nose occasionally.  He is currently using saline spray and occasional saline irrigation.    On exam, his septum is straight and midline.  Dorsum is straight and midline.  Nasal valves are well supported.  Turbinates are in the lateralized position.  The mid/posterior septal perforation is stable as compared to prior to surgery.    After verbal consent a nasal endoscopy was performed.  No lesions in the nasal cavity, middle meatus or sphenoethmoid recess were seen.  No purulence was noted.      Patient does have polypoid mucosa around the middle meatus bilaterally.        We discussed reinitiating Flonase.  Patient has an appointment with Dr. Torres next week.  He will follow-up with me as needed.    Jonathan Frankel, MD  Facial Plastic Surgery  Otolaryngology - [facility]    This note was dictated with Dragon Naturally Speaking and may contain some grammatical errors due to limitations of the software.

## 2024-07-16 ENCOUNTER — APPOINTMENT (OUTPATIENT)
Dept: OTOLARYNGOLOGY | Facility: CLINIC | Age: 29
End: 2024-07-16
Payer: COMMERCIAL

## 2024-07-16 VITALS — WEIGHT: 205.7 LBS | BODY MASS INDEX: 27.14 KG/M2 | TEMPERATURE: 97.2 F

## 2024-07-16 DIAGNOSIS — J32.0 CHRONIC MAXILLARY SINUSITIS: ICD-10-CM

## 2024-07-16 DIAGNOSIS — R09.81 NASAL CONGESTION: ICD-10-CM

## 2024-07-16 DIAGNOSIS — J34.89 NASAL CRUSTING: Primary | ICD-10-CM

## 2024-07-16 DIAGNOSIS — J32.2 CHRONIC ETHMOIDAL SINUSITIS: ICD-10-CM

## 2024-07-16 PROCEDURE — 1036F TOBACCO NON-USER: CPT | Performed by: OTOLARYNGOLOGY

## 2024-07-16 PROCEDURE — 99213 OFFICE O/P EST LOW 20 MIN: CPT | Performed by: OTOLARYNGOLOGY

## 2024-07-16 PROCEDURE — 31231 NASAL ENDOSCOPY DX: CPT | Performed by: OTOLARYNGOLOGY

## 2024-07-16 NOTE — PROGRESS NOTES
Chief Complaint:  1.  Chronic sinusitis s/p bilateral endoscopic sinus surgery 5/1/24  2.  Nasal airway obstruction s/p revision nasal airway surgery with Dr. Frankel 5/1/24  3.  Postnasal drainage  4.  Facial pain and pressure, headaches    History Of Present Illness:    Edward Joseph presents since last being seen 5/13/24.     Intermittently, he can produce debris when he blows his nose particularly on the left.  When he gets the debris out his breathing is perfect but when he has the debris on the left side his breathing is somewhat limited.    Main Symptoms:  Patient has anterior nasal drainage.   sometimes  Patient does not have  posterior nasal drainage.    Patient has nasal airway obstruction. waxes and wanes (perfect when debris is out of nose)  Patient does not have  facial pain.    Patient does not have  facial pressure.    He does not have decreased sense of smell  Associated Symptoms:   Patient does not have  headaches.    Patient does not have throat clearing.    Patient does not have coughing.    Patient does not have dysphonia.   Patient does not have nasal bleeding.     Medications currently on for sinonasal symptoms:  None (for the past 3 weeks); did use saline spray for a while after his last assessment with me    Active Problems:  Patient Active Problem List   Diagnosis    Rash of unknown cause    Nasal septal perforation    Nasal septal deviation    Nasal valve collapse    Nasal obstruction    Other chronic sinusitis    Deviated nasal septum    Hypertrophy of inferior nasal turbinate    Nasal valve stenosis    Chronic ethmoidal sinusitis    Chronic maxillary sinusitis        Past Medical History:  He has a past medical history of Deviated nasal septum.    Surgical History:  He has a past surgical history that includes Tonsillectomy; Adenoidectomy; and [place] tooth extraction.     Family History:  Family History   Family history unknown: Yes       Social History:  He reports that he has never  smoked. He has never used smokeless tobacco. He reports current alcohol use. Drug use questions deferred to the physician.     Allergies:  Penicillin    Current Meds:    Current Outpatient Medications:     docusate sodium (Colace) 100 mg capsule, Take 1 capsule (100 mg) by mouth 2 times a day as needed for constipation. While taking narcotics (Patient not taking: Reported on 5/7/2024), Disp: 60 capsule, Rfl: 0    fluticasone (Flonase) 50 mcg/actuation nasal spray, Administer 2 sprays into each nostril once daily. Shake gently. Before first use, prime pump. Spray 1 spray in each nostril twice daily. After use, clean tip and replace cap., Disp: 16 g, Rfl: 3    oxyCODONE-acetaminophen (Percocet) 5-325 mg tablet, Take 2 tablets by mouth every 6 hours if needed for severe pain (7 - 10). (Patient not taking: Reported on 5/7/2024), Disp: 20 tablet, Rfl: 0    oxymetazoline 0.05 % nasal spray, Administer 2 sprays into each nostril if needed (Nose Bleed) for up to 3 days. Do not use for more than 3 days., Disp: 15 mL, Rfl: 0    sodium chloride (Saline Mist) 0.65 % nasal spray, Administer 2 sprays into each nostril every 2 hours while awake., Disp: 44 mL, Rfl: 3    Vitals:  Visit Vitals  Smoking Status Never      Physical Exam:  Nose: On external exam there are neither lesions nor asymmetry of the nasal tip/dorsum. On anterior rhinoscopy, visualization posteriorly is limited on anterior examination. For this reason, to adequately evaluate posteriorly for masses, source of epistaxis, polypoid disease, debridement, and/or signs of infections, nasal endoscopy is indicated.  (Please see procedure below.)    SINONASAL ENDOSCOPY (CPT 05777): To better evaluate the patient's symptoms, sinonasal endoscopy is indicated.  After discussion of risks and benefits, and topical decongestion and anesthesia,an endoscope was used to perform nasal endoscopy on each side.  A time out identifying the patient, the procedure, the location of the  procedure and any concerns was performed prior to beginning the procedure.    Findings: Examination of the right nasal cavity revealed a patent maxillary, ethmoid, and frontal with normal appearing mucosa.  At the mid septum there was a well-healed septal perforation with clean edges.  Examination of the left nasal cavity revealed some crusting on the septum about 2-3 cm into his nasal cavity.  The maxillary, ethmoid, and frontal were each widely patent.  There was some mucosal edema within the anterior ethmoid cavity.  Each sphenoethmoid recess was normal without pus or polyps.    Provider Impressions:  1.  Chronic sinusitis s/p bilateral endoscopic sinus surgery 5/1/24  2.  Nasal airway obstruction s/p revision nasal airway surgery with Dr. Frankel 5/1/24  3.  Rhinorrhea    Discussion: Edward Joseph and I discussed his exam particularly the crusting on the left-hand side.  I recommended that he initiate saline gel and a sample was provided and he can obtain this over-the-counter.  I asked him to use this 2-3 times per day for the next month.  When the crusting resolves I have asked him to initiate Flonase 2 sprays each nostril once per day.  We discussed differences in technique with the saline gel aiming towards the septum and Flonase away from the septum.  I would like to see him back in about 2 months.  All questions were answered.    Signature:  Remy Torres MD

## 2024-09-17 ENCOUNTER — APPOINTMENT (OUTPATIENT)
Dept: OTOLARYNGOLOGY | Facility: CLINIC | Age: 29
End: 2024-09-17
Payer: COMMERCIAL

## 2024-12-10 ENCOUNTER — APPOINTMENT (OUTPATIENT)
Dept: OTOLARYNGOLOGY | Facility: CLINIC | Age: 29
End: 2024-12-10
Payer: COMMERCIAL

## 2025-01-06 ENCOUNTER — APPOINTMENT (OUTPATIENT)
Dept: PRIMARY CARE | Facility: CLINIC | Age: 30
End: 2025-01-06
Payer: COMMERCIAL

## 2025-01-06 VITALS
RESPIRATION RATE: 24 BRPM | DIASTOLIC BLOOD PRESSURE: 68 MMHG | WEIGHT: 209.7 LBS | HEIGHT: 73 IN | BODY MASS INDEX: 27.79 KG/M2 | OXYGEN SATURATION: 98 % | SYSTOLIC BLOOD PRESSURE: 114 MMHG | HEART RATE: 67 BPM

## 2025-01-06 DIAGNOSIS — R53.83 OTHER FATIGUE: ICD-10-CM

## 2025-01-06 DIAGNOSIS — Z00.00 ANNUAL PHYSICAL EXAM: Primary | ICD-10-CM

## 2025-01-06 PROCEDURE — 99385 PREV VISIT NEW AGE 18-39: CPT | Performed by: INTERNAL MEDICINE

## 2025-01-06 PROCEDURE — 3008F BODY MASS INDEX DOCD: CPT | Performed by: INTERNAL MEDICINE

## 2025-01-06 PROCEDURE — 99203 OFFICE O/P NEW LOW 30 MIN: CPT | Performed by: INTERNAL MEDICINE

## 2025-01-06 PROCEDURE — 1036F TOBACCO NON-USER: CPT | Performed by: INTERNAL MEDICINE

## 2025-01-06 RX ORDER — BISMUTH SUBSALICYLATE 262 MG
1 TABLET,CHEWABLE ORAL DAILY
COMMUNITY

## 2025-01-06 ASSESSMENT — ENCOUNTER SYMPTOMS
FATIGUE: 1
SLEEP DISTURBANCE: 1

## 2025-01-06 NOTE — PATIENT INSTRUCTIONS
Please remember you need to be fasting when you obtain your labwork. That means nothing to eat or drink for 8-12 hours (water or black coffee are OK though).    You need to get your labwork between the hours of 8-9am.

## 2025-01-06 NOTE — PROGRESS NOTES
"Subjective   Patient ID: Edward Joseph is a 29 y.o. male who presents for Establish Care and Annual Exam.    Pt presents to get established and for a physical.    PMH:  -Epistaxis: Used to occur frequently as a child. Got a septoplasty which didn't resolve this. Got a rhinoplasty afterwards.    Tries to workout daily. Gets a little dizzy sometimes after working out.    Has had diminished energy. Has had a harder time to have erections and diminished sexual drive. Is able to fall asleep OK but wakes up frequently. Isn't sure why; doesn't have to urinate and denies having his mind race. Tries to limit caffeine intake in the afternoon (has a coffee in the morning and an occasional energy drink by 3pm at the latest).        Review of Systems   Constitutional:  Positive for fatigue.   Psychiatric/Behavioral:  Positive for sleep disturbance (Wakes up frequently).        /68 (BP Location: Left arm, Patient Position: Sitting)   Pulse 67   Resp 24   Ht 1.848 m (6' 0.75\")   Wt 95.1 kg (209 lb 11.2 oz)   SpO2 98%   BMI 27.86 kg/m²   Objective   Physical Exam  Constitutional:       General: He is not in acute distress.     Appearance: He is not ill-appearing, toxic-appearing or diaphoretic.   HENT:      Head: Normocephalic and atraumatic.   Eyes:      General: No scleral icterus.     Conjunctiva/sclera: Conjunctivae normal.   Cardiovascular:      Rate and Rhythm: Normal rate and regular rhythm.      Heart sounds: No murmur heard.     No friction rub. No gallop.   Pulmonary:      Effort: Pulmonary effort is normal. No respiratory distress.      Breath sounds: No stridor. No wheezing, rhonchi or rales.   Abdominal:      General: Abdomen is flat. Bowel sounds are normal. There is no distension.      Palpations: Abdomen is soft.      Tenderness: There is no abdominal tenderness. There is no guarding.   Musculoskeletal:      Cervical back: Normal range of motion and neck supple. No tenderness.   Lymphadenopathy:      " Cervical: No cervical adenopathy.   Skin:     General: Skin is warm and dry.   Neurological:      Mental Status: He is alert.         Assessment/Plan   Problem List Items Addressed This Visit    None  Visit Diagnoses         Codes    Annual physical exam    -  Primary Z00.00    Relevant Orders    Comprehensive metabolic panel    CBC    Lipid panel    Other fatigue     R53.83    Relevant Orders    Tsh With Reflex To Free T4 If Abnormal    Vitamin D 25-Hydroxy,Total (for eval of Vitamin D levels)    Testosterone        -Labwork as above.  -To get TDAP when able.  -Diminished energy levels could be from low testosterone given  symptoms. Will check for this, TSH, and vitamin D to ensure nothing is contributing. Reviewed optimal timing of testosterone labwork. If labwork normal, sleep may be the main issue. Discussed good sleep hygiene. If there is no improvement with that and labwork looks good, then pt to follow up in 3-6 months. Is agreeable with plan.         Behzad Uribe MD 01/06/25 11:59 AM

## 2025-01-07 ENCOUNTER — LAB (OUTPATIENT)
Dept: LAB | Facility: LAB | Age: 30
End: 2025-01-07
Payer: COMMERCIAL

## 2025-01-07 DIAGNOSIS — R53.83 OTHER FATIGUE: ICD-10-CM

## 2025-01-07 DIAGNOSIS — Z00.00 ANNUAL PHYSICAL EXAM: ICD-10-CM

## 2025-01-07 LAB
25(OH)D3 SERPL-MCNC: 67 NG/ML (ref 30–100)
ALBUMIN SERPL BCP-MCNC: 4.6 G/DL (ref 3.4–5)
ALP SERPL-CCNC: 44 U/L (ref 33–120)
ALT SERPL W P-5'-P-CCNC: 16 U/L (ref 10–52)
ANION GAP SERPL CALC-SCNC: 12 MMOL/L (ref 10–20)
AST SERPL W P-5'-P-CCNC: 16 U/L (ref 9–39)
BILIRUB SERPL-MCNC: 0.6 MG/DL (ref 0–1.2)
BUN SERPL-MCNC: 17 MG/DL (ref 6–23)
CALCIUM SERPL-MCNC: 9.3 MG/DL (ref 8.6–10.6)
CHLORIDE SERPL-SCNC: 103 MMOL/L (ref 98–107)
CHOLEST SERPL-MCNC: 158 MG/DL (ref 0–199)
CHOLESTEROL/HDL RATIO: 2.9
CO2 SERPL-SCNC: 30 MMOL/L (ref 21–32)
CREAT SERPL-MCNC: 1.13 MG/DL (ref 0.5–1.3)
EGFRCR SERPLBLD CKD-EPI 2021: >90 ML/MIN/1.73M*2
ERYTHROCYTE [DISTWIDTH] IN BLOOD BY AUTOMATED COUNT: 11.9 % (ref 11.5–14.5)
GLUCOSE SERPL-MCNC: 86 MG/DL (ref 74–99)
HCT VFR BLD AUTO: 49.4 % (ref 41–52)
HDLC SERPL-MCNC: 55.3 MG/DL
HGB BLD-MCNC: 15.9 G/DL (ref 13.5–17.5)
LDLC SERPL CALC-MCNC: 85 MG/DL
MCH RBC QN AUTO: 29.9 PG (ref 26–34)
MCHC RBC AUTO-ENTMCNC: 32.2 G/DL (ref 32–36)
MCV RBC AUTO: 93 FL (ref 80–100)
NON HDL CHOLESTEROL: 103 MG/DL (ref 0–149)
NRBC BLD-RTO: 0 /100 WBCS (ref 0–0)
PLATELET # BLD AUTO: 162 X10*3/UL (ref 150–450)
POTASSIUM SERPL-SCNC: 4.2 MMOL/L (ref 3.5–5.3)
PROT SERPL-MCNC: 6.9 G/DL (ref 6.4–8.2)
RBC # BLD AUTO: 5.32 X10*6/UL (ref 4.5–5.9)
SODIUM SERPL-SCNC: 141 MMOL/L (ref 136–145)
TESTOST SERPL-MCNC: 679 NG/DL (ref 240–1000)
TRIGL SERPL-MCNC: 90 MG/DL (ref 0–149)
TSH SERPL-ACNC: 2.96 MIU/L (ref 0.44–3.98)
VLDL: 18 MG/DL (ref 0–40)
WBC # BLD AUTO: 4.5 X10*3/UL (ref 4.4–11.3)

## 2025-01-07 PROCEDURE — 85027 COMPLETE CBC AUTOMATED: CPT

## 2025-01-07 PROCEDURE — 80061 LIPID PANEL: CPT

## 2025-01-07 PROCEDURE — 84403 ASSAY OF TOTAL TESTOSTERONE: CPT

## 2025-01-07 PROCEDURE — 84443 ASSAY THYROID STIM HORMONE: CPT

## 2025-01-07 PROCEDURE — 80053 COMPREHEN METABOLIC PANEL: CPT

## 2025-01-07 PROCEDURE — 82306 VITAMIN D 25 HYDROXY: CPT

## 2025-01-21 ENCOUNTER — APPOINTMENT (OUTPATIENT)
Dept: PRIMARY CARE | Facility: CLINIC | Age: 30
End: 2025-01-21
Payer: COMMERCIAL

## 2025-01-22 NOTE — PROGRESS NOTES
Subjective   Patient ID: Edward Joseph is a 29 y.o. male    HPI  29 y.o. male who presents to [facility] for erectile dysfunction. He reports he has been in a relationship for the past 3 months.  He notes about 1 month ago he started experiencing a hard time getting and maintaining an erection. He reports his morning erections are not as strong as they were in the past. He notes he has done some research to attempt to resolve the dysfunction with no improvement. He reports he does eat healthy and exercise. He notes experiencing headaches and some lower back pain that have been occurring off and on fro awhile (unspecified). He does not masturbate currently. He denies any pain at the base of the penis and prostate region during physical exam today.    The most recent Testosterone, conducted on 01/07/2025, revealed:  679 ng/dL       Review of Systems    All systems were reviewed. Anything negative was noted in the HPI.    Objective   Physical Exam  Genitourinary:     Pubic Area: No rash.       Penis: Normal and circumcised. No hypospadias.       Testes:         Right: Mass, tenderness, swelling, testicular hydrocele or varicocele not present. Right testis is descended.         Left: Mass, tenderness, swelling, testicular hydrocele or varicocele not present. Left testis is descended.      Epididymis:      Right: Not inflamed or enlarged. No mass or tenderness.      Left: Not inflamed or enlarged. No mass or tenderness.         General: Well developed, well nourished, alert and cooperative, appears in no acute distress   Eyes: Non-injected conjunctiva, sclera clear, no proptosis   Cardiac: Extremities are warm and well perfused. No edema, cyanosis or pallor   Lungs: Breathing is easy, non-labored. Speaking in clear and complete sentences. Normal diaphragmatic movement   MSK: Ambulatory with steady gait, unassisted   Neuro: Alert and oriented to person, place, and time   Psych: Demonstrates good judgment and reason,  without hallucinations, abnormal affect or abnormal behaviors   Skin: No obvious lesions, no rashes       No CVA tenderness bilaterally   No suprapubic pain or discomfort       Past Medical History:   Diagnosis Date    Deviated nasal septum     Headache     Varicella          Past Surgical History:   Procedure Laterality Date    ADENOIDECTOMY      KNEE ARTHROSCOPY W/ ACL RECONSTRUCTION Right     RHINOPLASTY      SEPTOPLASTY      TONSILLECTOMY      WISDOM TOOTH EXTRACTION           Assessment/Plan   Erectile Dysfunction, possible psychogenic     29 y.o. male who presents for the above condition, We had a very long and extensive discussion with the patient regarding the pathophysiology, differential diagnosis, risk factor, and management of ED. We discussed at length mechanism of action, risk, benefit, potential complication, adverse events of PDE 5 inhibitors in the form of sildenafil 100mg as needed. Instructed the patient to stop the medication in case of any side effects.      - Provided pt the options to prescribe Cialis 5 mg daily, referral to sex therapy, or observation.    Plan:  - Follow-up in 4 months  - Prescribed Cialis 5 mg daily (advised pt if he experiences a headache try taking 2.5 mg, if headaches continue stop taking it)      E&M visit today is associated with current or anticipated ongoing medical care services related to a patient's single, serious condition or a complex condition.     1/23/2025    Scribe Attestation  By signing my name below, IDheeraj Scribe attest that this documentation has been prepared under the direction and in the presence of Dr. Adrian Cochran.

## 2025-01-23 ENCOUNTER — OFFICE VISIT (OUTPATIENT)
Dept: UROLOGY | Facility: HOSPITAL | Age: 30
End: 2025-01-23
Payer: COMMERCIAL

## 2025-01-23 DIAGNOSIS — N52.9 ERECTILE DYSFUNCTION, UNSPECIFIED ERECTILE DYSFUNCTION TYPE: Primary | ICD-10-CM

## 2025-01-23 PROCEDURE — 99214 OFFICE O/P EST MOD 30 MIN: CPT | Performed by: STUDENT IN AN ORGANIZED HEALTH CARE EDUCATION/TRAINING PROGRAM

## 2025-01-23 PROCEDURE — 99204 OFFICE O/P NEW MOD 45 MIN: CPT | Performed by: STUDENT IN AN ORGANIZED HEALTH CARE EDUCATION/TRAINING PROGRAM

## 2025-01-23 RX ORDER — TADALAFIL 5 MG/1
5 TABLET ORAL DAILY
Qty: 30 TABLET | Refills: 4 | Status: SHIPPED | OUTPATIENT
Start: 2025-01-23 | End: 2025-05-23

## 2025-04-14 ENCOUNTER — APPOINTMENT (OUTPATIENT)
Dept: PRIMARY CARE | Facility: CLINIC | Age: 30
End: 2025-04-14
Payer: COMMERCIAL

## 2025-04-14 VITALS
HEART RATE: 63 BPM | WEIGHT: 199.9 LBS | BODY MASS INDEX: 26.56 KG/M2 | DIASTOLIC BLOOD PRESSURE: 63 MMHG | OXYGEN SATURATION: 98 % | RESPIRATION RATE: 18 BRPM | SYSTOLIC BLOOD PRESSURE: 101 MMHG

## 2025-04-14 DIAGNOSIS — F51.01 PRIMARY INSOMNIA: Primary | ICD-10-CM

## 2025-04-14 PROCEDURE — 99213 OFFICE O/P EST LOW 20 MIN: CPT | Performed by: INTERNAL MEDICINE

## 2025-04-14 RX ORDER — TRAZODONE HYDROCHLORIDE 50 MG/1
50 TABLET ORAL NIGHTLY PRN
Qty: 30 TABLET | Refills: 3 | Status: SHIPPED | OUTPATIENT
Start: 2025-04-14 | End: 2026-04-14

## 2025-04-14 ASSESSMENT — ENCOUNTER SYMPTOMS
HEADACHES: 1
FATIGUE: 1
SLEEP DISTURBANCE: 1

## 2025-04-14 NOTE — PROGRESS NOTES
Subjective   Patient ID: Edward Joseph is a 29 y.o. male who presents for Insomnia (Wondering if could get low dose sleep aid, advice).    Has been having difficulty sleeping the last few months. Was told by his therapist to follow up here.    Has difficulty falling asleep, and when he wakes up has difficulty falling back asleep. Prior to this used to fall asleep easier. Drinks minimal caffeine, but if he has anything will usually have just 1 cup of coffee in the morning.    Gets home around 7pm after exercising. Will have screen time until 9pm. Tries to watch an episode in the bedroom then puts his phone done to sleep. Tried melatonin w/o success. Taking magnesium glycinate which also hasn't helped. Has been waking up with headaches during this time.        Review of Systems   Constitutional:  Positive for fatigue.   Neurological:  Positive for headaches.   Psychiatric/Behavioral:  Positive for sleep disturbance.        /63 (BP Location: Right arm, Patient Position: Sitting)   Pulse 63   Resp 18   Wt 90.7 kg (199 lb 14.4 oz)   SpO2 98%   BMI 26.56 kg/m²   Objective   Physical Exam  Constitutional:       General: He is not in acute distress.     Appearance: He is not ill-appearing, toxic-appearing or diaphoretic.   HENT:      Head: Normocephalic and atraumatic.   Eyes:      Conjunctiva/sclera: Conjunctivae normal.   Neurological:      Mental Status: He is alert.         Assessment/Plan   Problem List Items Addressed This Visit             ICD-10-CM    Primary insomnia - Primary F51.01     -Hx concerning for insomnia +/- sleep apnea.  -Discussed testing for sleep apnea as opposed to trying medication for insomnia. Pt would rather hold off on sleep apnea testing to start as he isn't sure if insurance would cover this.  -Reviewed appropriate sleep hygiene. Given pamphlet on this topic as well. Encouraged to move TV out of bedroom.  -Went over trazodone, doxepin, and amitriptyline. Pt agreeable with trying  trazodone after reviewing side effects. Went over how to take medication and what to expect.  -If medication works then will see pt back next year for physical. If not, pt agrees to follow up afterwards to determine next steps. All questions answered.         Relevant Medications    traZODone (Desyrel) 50 mg tablet            Behzad Uribe MD 04/14/25 4:34 PM

## 2025-04-14 NOTE — ASSESSMENT & PLAN NOTE
-Hx concerning for insomnia +/- sleep apnea.  -Discussed testing for sleep apnea as opposed to trying medication for insomnia. Pt would rather hold off on sleep apnea testing to start as he isn't sure if insurance would cover this.  -Reviewed appropriate sleep hygiene. Given pamphlet on this topic as well. Encouraged to move TV out of bedroom.  -Went over trazodone, doxepin, and amitriptyline. Pt agreeable with trying trazodone after reviewing side effects. Went over how to take medication and what to expect.  -If medication works then will see pt back next year for physical. If not, pt agrees to follow up afterwards to determine next steps. All questions answered.   no

## 2025-04-28 NOTE — PROGRESS NOTES
Subjective   Patient ID: Edward Joseph is a 29 y.o. male    HPI  29 y.o. male who presents for a 4 months follow-up visit with erectile dysfunction. He reports he has been in a relationship for the past 3 months.  He notes about 1 month ago he started experiencing a hard time getting and maintaining an erection. He reports his morning erections are not as strong as they were in the past. He notes he has done some research to attempt to resolve the dysfunction with no improvement. He reports he does eat healthy and exercise. He notes experiencing headaches and some lower back pain that have been occurring off and on fro awhile (unspecified). He does not masturbate currently. He denies any pain at the base of the penis and prostate region during physical exam today.    Today, he reports no change in his erectile symptoms. He does believe that it is stress related. Denies any urinary symptoms such as urgency, frequency, hematuria, pain, burning, and infections. Denies any testicular and penile pain.         Review of Systems    All systems were reviewed. Anything negative was noted in the HPI.    Objective   Physical Exam  Genitourinary:     Pubic Area: No rash.       Penis: Normal and circumcised. No hypospadias.       Testes:         Right: Mass, tenderness, swelling, testicular hydrocele or varicocele not present. Right testis is descended.         Left: Mass, tenderness, swelling, testicular hydrocele or varicocele not present. Left testis is descended.      Epididymis:      Right: Not inflamed or enlarged. No mass or tenderness.      Left: Not inflamed or enlarged. No mass or tenderness.         General: Well developed, well nourished, alert and cooperative, appears in no acute distress   Eyes: Non-injected conjunctiva, sclera clear, no proptosis   Cardiac: Extremities are warm and well perfused. No edema, cyanosis or pallor   Lungs: Breathing is easy, non-labored. Speaking in clear and complete sentences.  Normal diaphragmatic movement   MSK: Ambulatory with steady gait, unassisted   Neuro: Alert and oriented to person, place, and time   Psych: Demonstrates good judgment and reason, without hallucinations, abnormal affect or abnormal behaviors   Skin: No obvious lesions, no rashes       No CVA tenderness bilaterally   No suprapubic pain or discomfort       Past Medical History:   Diagnosis Date    Deviated nasal septum     Headache     Varicella        Past Surgical History:   Procedure Laterality Date    ADENOIDECTOMY      KNEE ARTHROSCOPY W/ ACL RECONSTRUCTION Right     RHINOPLASTY      SEPTOPLASTY      TONSILLECTOMY      WISDOM TOOTH EXTRACTION         Assessment/Plan   Erectile Dysfunction, possible psychogenic     29 y.o. male who presents for the above condition, We had a very long and extensive discussion with the patient regarding the pathophysiology, differential diagnosis, risk factor, and management of ED. We discussed at length mechanism of action, risk, benefit, potential complication, adverse events of PDE 5 inhibitors in the form of sildenafil 100mg as needed. Instructed the patient to stop the medication in case of any side effects.      - Provided pt the options to prescribe Cialis 5 mg daily, referral to sex therapy, or observation.    Plan:  - Follow-up in 6 months  - Referred to Dr. Mattson      E&M visit today is associated with current or anticipated ongoing medical care services related to a patient's single, serious condition or a complex condition.     4/29/2025    Scribe Attestation  By signing my name below, Dheeraj ESCOBEDO Scribe attest that this documentation has been prepared under the direction and in the presence of Dr. Adrian Cochran.

## 2025-04-29 ENCOUNTER — APPOINTMENT (OUTPATIENT)
Dept: UROLOGY | Facility: HOSPITAL | Age: 30
End: 2025-04-29
Payer: COMMERCIAL

## 2025-04-29 DIAGNOSIS — N52.9 ERECTILE DYSFUNCTION, UNSPECIFIED ERECTILE DYSFUNCTION TYPE: Primary | ICD-10-CM

## 2025-04-29 PROCEDURE — 99214 OFFICE O/P EST MOD 30 MIN: CPT | Performed by: STUDENT IN AN ORGANIZED HEALTH CARE EDUCATION/TRAINING PROGRAM

## 2025-05-05 ENCOUNTER — APPOINTMENT (OUTPATIENT)
Dept: PRIMARY CARE | Facility: CLINIC | Age: 30
End: 2025-05-05
Payer: COMMERCIAL

## 2025-05-20 ENCOUNTER — APPOINTMENT (OUTPATIENT)
Dept: UROLOGY | Facility: HOSPITAL | Age: 30
End: 2025-05-20
Payer: COMMERCIAL

## 2025-05-22 ENCOUNTER — APPOINTMENT (OUTPATIENT)
Dept: UROLOGY | Facility: HOSPITAL | Age: 30
End: 2025-05-22
Payer: COMMERCIAL

## 2025-06-12 ENCOUNTER — APPOINTMENT (OUTPATIENT)
Dept: UROLOGY | Facility: HOSPITAL | Age: 30
End: 2025-06-12
Payer: COMMERCIAL

## 2025-06-12 DIAGNOSIS — F41.9 ANXIETY: Primary | ICD-10-CM

## 2025-06-12 DIAGNOSIS — N52.9 ERECTILE DYSFUNCTION, UNSPECIFIED ERECTILE DYSFUNCTION TYPE: ICD-10-CM

## 2025-06-12 PROCEDURE — 90791 PSYCH DIAGNOSTIC EVALUATION: CPT | Performed by: PSYCHOLOGIST

## 2025-06-23 PROBLEM — N52.9 ERECTILE DYSFUNCTION: Status: ACTIVE | Noted: 2025-06-23

## 2025-06-23 PROBLEM — F41.9 ANXIETY: Status: ACTIVE | Noted: 2025-06-23

## 2025-06-23 NOTE — PROGRESS NOTES
Outpatient Psychology Initial Appointment  Subjective   Edward Joseph, a 30 y.o. male, for initial evaluation visit. Participated in diagnostic interview and identification of goals for treatment.      Patient is referred by Adrian Cochran MD.      Reason:  He has been experiencing difficulties with sexual functioning.      Psychosocial History Edward reports that he is seeking psychological/sex therapy services under the recommendation of his urologist.  He reports that he has engaged in therapy before for anxiety.      Edward states that he was in a relationship prior to this appointment, but ended that relationship a couple of months ago.  Since that time he started with a few dates with a new person.  He was previously  at the age of 25, and was in that marriage for 2 years. He states that 3-4 years after his divorce he wanted to get rid of the pressure. Edward expressed that he believes that the most difficulty seems to come from the pressure/anxiety of the situation. He physically is healthy, works out, and reported no major difficulties other than sleep not being good. He does not take any medication other than Vitamin D, Fish Oil, and Magnesium Glycinate.    Edward states that he grew up in [place] and currently lives in [place], [place].  He and his brother (age 26) have lived together for three years. 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  He works from home. 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     Edward reports that he would like to address his anxiety about sex for better response and improved satisfaction.     Mental Status Evaluation:  Appearance:  age appropriate   Behavior:  normal   Speech:  normal pitch and normal volume   Mood:  normal   Affect:  normal   Thought Process:  normal   Thought Content:  normal   Sensorium:  person, place, time/date, situation, day of week, month of year, and year   Cognition:  grossly intact    Insight:  Intact, as evidenced by ability to draw insights from thoughts, feelings, experiences.          Assessment/Plan     Diagnosis: Problem List[1]    Treatment Goals:  Specify outcomes written in observable, behavioral terms:   Anxiety: eliminating avoidance (specify partnered sexual encounter), modifying schemata of threat/vulnerability/need for control (or other schemata -- specify sexual scripting), reducing negative automatic thoughts, and reducing physical symptoms of anxiety  Improve sexual response and functioning; improve sexual satisfaction.     Treatment Plan/Recommendations: Provided psychoeducation/psychosexual education regarding the overlay of mental health and sexual health as it pertains to patient's presenting concerns.    Teach Cognitive and Behavioral strategies for mood/anxiety management.    Teach Cognitive and Behavioral strategies for improved sexual response and functioning.    Teach intimacy skills towards improved sexual satisfaction.       Review with patient: Treatment plan reviewed with the patient.    Jorge Thomson PsyD         [1]   Patient Active Problem List  Diagnosis    Rash of unknown cause    Nasal septal perforation    Nasal septal deviation    Nasal valve collapse    Nasal obstruction    Other chronic sinusitis    Deviated nasal septum    Hypertrophy of inferior nasal turbinate    Nasal valve stenosis    Chronic ethmoidal sinusitis    Chronic maxillary sinusitis    Primary insomnia

## 2025-07-17 ENCOUNTER — OFFICE VISIT (OUTPATIENT)
Dept: PRIMARY CARE | Facility: CLINIC | Age: 30
End: 2025-07-17
Payer: COMMERCIAL

## 2025-07-17 VITALS
DIASTOLIC BLOOD PRESSURE: 75 MMHG | WEIGHT: 200.7 LBS | RESPIRATION RATE: 20 BRPM | HEART RATE: 56 BPM | SYSTOLIC BLOOD PRESSURE: 119 MMHG | BODY MASS INDEX: 26.66 KG/M2 | OXYGEN SATURATION: 99 %

## 2025-07-17 DIAGNOSIS — F51.01 PRIMARY INSOMNIA: Primary | ICD-10-CM

## 2025-07-17 DIAGNOSIS — R51.9 OCCIPITAL HEADACHE: ICD-10-CM

## 2025-07-17 PROCEDURE — 99213 OFFICE O/P EST LOW 20 MIN: CPT | Performed by: INTERNAL MEDICINE

## 2025-07-17 RX ORDER — AMITRIPTYLINE HYDROCHLORIDE 10 MG/1
10 TABLET, FILM COATED ORAL NIGHTLY
Qty: 30 TABLET | Refills: 11 | Status: SHIPPED | OUTPATIENT
Start: 2025-07-17 | End: 2026-07-17

## 2025-07-17 ASSESSMENT — ENCOUNTER SYMPTOMS
HEADACHES: 1
INSOMNIA: 1

## 2025-07-17 NOTE — PROGRESS NOTES
Subjective   Patient ID: Edward Joseph is a 30 y.o. male who presents for Headache and Insomnia.    Here for follow up.    Trazodone at lower doses wasn't helping. When he upped it to 100-150mg, it would make him too groggy in the AM.    Last night just got 2 hours of sleep.    Has been having headaches along the back of his head. Has had this sensation for 4-5 months. Worsens when he doesn't sleep. Pain is constantly present, feels like a throbbing pain, will occasionally radiate, and has worsened over time.    Will get occasional changes in his vision when working out. Similarly when exercising or looking at a computer can get dizzy.    Headache   Associated symptoms include insomnia.   Insomnia  Associated symptoms include headaches.       Review of Systems   Neurological:  Positive for headaches.   Psychiatric/Behavioral:  The patient has insomnia.        /75 (BP Location: Right arm, Patient Position: Sitting)   Pulse 56   Resp 20   Wt 91 kg (200 lb 11.2 oz)   SpO2 99%   BMI 26.66 kg/m²   Objective   Physical Exam  Constitutional:       General: He is not in acute distress.     Appearance: He is not ill-appearing, toxic-appearing or diaphoretic.   HENT:      Head: Normocephalic and atraumatic.     Eyes:      Conjunctiva/sclera: Conjunctivae normal.       Musculoskeletal:      Cervical back: No rigidity.     Neurological:      Mental Status: He is alert.         Assessment/Plan   Problem List Items Addressed This Visit           ICD-10-CM    Primary insomnia - Primary F51.01    Relevant Medications    amitriptyline (Elavil) 10 mg tablet     Other Visit Diagnoses         Codes      Occipital headache     R51.9    Relevant Medications    amitriptyline (Elavil) 10 mg tablet    Other Relevant Orders    CT head wo IV contrast    XR cervical spine 2-3 views        -Pt presenting w/ continued insomnia and headaches which have started since last visit. HA is posterior, worsens when pt doesn't get enough  sleep, is constantly there, and has progressively worsened.  -We agreed to start amitriptyline to see if that helps with sleep (and for HA ppx). If no improvement after 1 week, then to double dose. If still no improvement, to let me know and we will change to doxepin. If no improvement there, then pt to get CTH and cervical XR to ensure nothing else is contributing to symptoms.  -Pt agreeable with plan. TCAs discussed including their side effect profile.         Behzad Uribe MD 07/17/25 10:49 AM

## 2025-08-12 ENCOUNTER — HOSPITAL ENCOUNTER (OUTPATIENT)
Dept: RADIOLOGY | Facility: CLINIC | Age: 30
Discharge: HOME | End: 2025-08-12
Payer: COMMERCIAL

## 2025-08-12 DIAGNOSIS — R51.9 OCCIPITAL HEADACHE: ICD-10-CM

## 2025-08-12 PROCEDURE — 70450 CT HEAD/BRAIN W/O DYE: CPT

## 2025-08-12 PROCEDURE — 70450 CT HEAD/BRAIN W/O DYE: CPT | Performed by: RADIOLOGY

## 2025-08-13 ENCOUNTER — APPOINTMENT (OUTPATIENT)
Facility: CLINIC | Age: 30
End: 2025-08-13
Payer: COMMERCIAL

## 2025-08-19 ENCOUNTER — APPOINTMENT (OUTPATIENT)
Dept: OTOLARYNGOLOGY | Facility: CLINIC | Age: 30
End: 2025-08-19
Payer: COMMERCIAL

## 2025-08-19 DIAGNOSIS — J32.0 CHRONIC MAXILLARY SINUSITIS: ICD-10-CM

## 2025-08-19 DIAGNOSIS — J32.8 OTHER CHRONIC SINUSITIS: ICD-10-CM

## 2025-08-19 RX ORDER — MOMETASONE FUROATE 100 %
POWDER (GRAM) MISCELLANEOUS
Qty: 180 G | Refills: 0 | Status: SHIPPED | OUTPATIENT
Start: 2025-08-19

## 2025-08-19 ASSESSMENT — PATIENT HEALTH QUESTIONNAIRE - PHQ9
2. FEELING DOWN, DEPRESSED OR HOPELESS: NOT AT ALL
SUM OF ALL RESPONSES TO PHQ9 QUESTIONS 1 AND 2: 0
1. LITTLE INTEREST OR PLEASURE IN DOING THINGS: NOT AT ALL

## 2025-10-14 ENCOUNTER — APPOINTMENT (OUTPATIENT)
Dept: OTOLARYNGOLOGY | Facility: CLINIC | Age: 30
End: 2025-10-14
Payer: COMMERCIAL

## 2025-10-28 ENCOUNTER — APPOINTMENT (OUTPATIENT)
Dept: UROLOGY | Facility: HOSPITAL | Age: 30
End: 2025-10-28
Payer: COMMERCIAL

## 2025-12-23 ENCOUNTER — APPOINTMENT (OUTPATIENT)
Dept: OTOLARYNGOLOGY | Facility: CLINIC | Age: 30
End: 2025-12-23
Payer: COMMERCIAL

## 2026-01-20 ENCOUNTER — APPOINTMENT (OUTPATIENT)
Dept: PRIMARY CARE | Facility: CLINIC | Age: 31
End: 2026-01-20
Payer: COMMERCIAL

## (undated) DEVICE — CONTAINER, SPECIMEN, 4 OZ, OR PEEL PACK, STERILE

## (undated) DEVICE — HOLSTER, JET SAFETY

## (undated) DEVICE — SYRINGE, 5 CC, LUER LOCK

## (undated) DEVICE — SUTURE, CHROMIC, 4-0, 18 IN, PS2, BROWN

## (undated) DEVICE — ADHESIVE, SKIN, MASTISOL, 2/3 CC VIAL

## (undated) DEVICE — TRACKER, INSTRUMENT

## (undated) DEVICE — GLOVE, SURGICAL, PROTEXIS PI , 7.5, PF, LF

## (undated) DEVICE — DRESSING, NON-ADHERENT, TELFA, OUCHLESS, 3 X 8 IN, STERILE

## (undated) DEVICE — GLOVE, SURGICAL, PROTEXIS PI , 8.0, PF, LF

## (undated) DEVICE — SUTURE, CHROMIC GUT, 5/0, P-3, 18IN

## (undated) DEVICE — Device

## (undated) DEVICE — ELECTRODE, ELECTROSURGICAL, COAGULATOR, W/SUCTION, HANDSWITCHING, 8 FR, 6 IN

## (undated) DEVICE — CLEANER, WIPE, INSTRUMENT, 3.25 X 3.25 IN

## (undated) DEVICE — SUTURE, PDS II, 5-0, 18 IN, P3, CLEAR

## (undated) DEVICE — SYRINGE, 20 CC, CONTROL, LUER LOCK, LF

## (undated) DEVICE — STRIP, SKIN CLOSURE, STERI-STRIP, REINFORCED, 0.25 X 3 IN

## (undated) DEVICE — TRAP, SPECIMEN, NEPTUNE QUICK COLLECTOR

## (undated) DEVICE — NEEDLE, HYPODERMIC, REGULAR WALL, REGULAR BEVEL, 27 G X 1.25 IN

## (undated) DEVICE — GOWN, SURGICAL, NON-REINFORCED, XLARGE, LF

## (undated) DEVICE — SUTURE, PLAIN, 5-0, 18 IN, PC1, YELLOW

## (undated) DEVICE — SPONGE, GAUZE, XRAY DECT, 16 PLY, 4 X 4, W/MASTER DMT,STERILE

## (undated) DEVICE — SUTURE, PROLENE, 3-0, 36 IN, SH, DA, BLUE

## (undated) DEVICE — SYRINGE, 50 CC, LUER LOCK

## (undated) DEVICE — TUBING, SUCTION, CONNECTING, STERILE 0.25 X 120 IN., LF

## (undated) DEVICE — MARKER, SKIN, XFINE TIP, W/RULER AND LABELS

## (undated) DEVICE — STAPLER, SKIN, PLUS, REGULAR, 35

## (undated) DEVICE — CATHETER, DRAINAGE, NASOGASTRIC, SUMP, SALEM, W/ANTI-REFLUX VALVE, 18 FR, 48 IN

## (undated) DEVICE — KNIFE, OPHTHALMIC, FULL HANDLE, 15 DEG

## (undated) DEVICE — TRACKER, STINGRAY, NON-INVASIVE, AXIEM STEALTH NAVIGATION

## (undated) DEVICE — BLADE, INFERIOR TURBINATE, M4 ROTATABLE, 2MM STR

## (undated) DEVICE — NEEDLE, HYPODERMIC, REGULAR WALL, REGULAR BEVEL, 30 G X 0.5 IN

## (undated) DEVICE — APPLICATOR, COTTON TIP, 3 IN, NON-STERILE